# Patient Record
Sex: FEMALE | Race: WHITE | Employment: FULL TIME | URBAN - METROPOLITAN AREA
[De-identification: names, ages, dates, MRNs, and addresses within clinical notes are randomized per-mention and may not be internally consistent; named-entity substitution may affect disease eponyms.]

---

## 2019-06-03 ENCOUNTER — NURSE ONLY (OUTPATIENT)
Dept: INTERNAL MEDICINE CLINIC | Facility: HOSPITAL | Age: 29
End: 2019-06-03
Attending: EMERGENCY MEDICINE

## 2019-06-03 DIAGNOSIS — Z00.00 WELLNESS EXAMINATION: Primary | ICD-10-CM

## 2019-06-03 PROCEDURE — 86787 VARICELLA-ZOSTER ANTIBODY: CPT

## 2019-10-18 ENCOUNTER — OFFICE VISIT (OUTPATIENT)
Dept: OTHER | Facility: HOSPITAL | Age: 29
End: 2019-10-18
Attending: EMERGENCY MEDICINE

## 2019-10-18 DIAGNOSIS — Z77.21 PERSONAL HISTORY OF EXPOSURE TO POTENTIALLY HAZARDOUS BODY FLUIDS: Primary | ICD-10-CM

## 2019-10-18 PROCEDURE — 86706 HEP B SURFACE ANTIBODY: CPT

## 2019-10-18 PROCEDURE — 86803 HEPATITIS C AB TEST: CPT

## 2019-10-18 PROCEDURE — 87389 HIV-1 AG W/HIV-1&-2 AB AG IA: CPT

## 2019-11-22 ENCOUNTER — TELEPHONE (OUTPATIENT)
Dept: FAMILY MEDICINE CLINIC | Facility: CLINIC | Age: 29
End: 2019-11-22

## 2019-11-22 NOTE — TELEPHONE ENCOUNTER
Called and left message on machine confirming appointment with Yoko Aguayo 11/25/19, asked that she call to reschedule if unable to keep appointment

## 2019-11-25 NOTE — PROGRESS NOTES
Dimitry Dixon is a 26year old female.     S:  Patient presents today with the following concerns:  Establish Care (New patient Moved from [facility] 5/2019)   Headache (Chronic daily HA at temple area or over entire head lasting all day)   Concentration (t Medication Sig Dispense Refill   • Propranolol HCl ER 60 MG Oral Capsule SR 24 Hr Take 1 capsule (60 mg total) by mouth daily. 30 capsule 0     There is no problem list on file for this patient. No family history on file.     REVIEW OF SYSTEMS:  GENERA Consults:  None    Discussed with patient I would like to try propranolol 1st as it may help with the anxiety, headaches, and palpitations. No other one medication could cover all of these. Monitor for low blood pressure symptoms.   Try 1st when she is

## 2019-11-26 PROBLEM — R51.9 CHRONIC DAILY HEADACHE: Status: ACTIVE | Noted: 2019-11-26

## 2019-11-26 PROBLEM — R00.2 INTERMITTENT PALPITATIONS: Status: ACTIVE | Noted: 2019-11-26

## 2019-11-26 PROBLEM — F41.9 ANXIETY: Status: ACTIVE | Noted: 2019-11-26

## 2019-11-27 NOTE — PATIENT INSTRUCTIONS
Your Body’s Response to Anxiety    Normal anxiety is part of the body’s natural defense system.  It's an alert to a threat that is unknown, vague, or comes from your own internal fears. While you’re in this state, your feelings can range from a vague sens Some people are more prone to persistent anxiety than others. It tends to run in families, and it affects more younger people than older people, and more women than men. But no age, race, or gender is immune to anxiety problems.   Anxiety can be treated  Th © 5301-6166 [employer] [address]. [address] [facility], [address]. All rights reserved. This information is not intended as a substitute for professional medical care. Always follow your healthcare professional's instructions.         Self-Ca Any of the following can be signs:  · Dull pain or feeling of pressure in a tight band around your head  · Pain in your neck or shoulders  · Headache without a definite beginning or end  · Headache after an activity such as driving or working on a computer

## 2019-12-10 ENCOUNTER — TELEPHONE (OUTPATIENT)
Dept: FAMILY MEDICINE CLINIC | Facility: CLINIC | Age: 29
End: 2019-12-10

## 2019-12-10 DIAGNOSIS — F41.9 ANXIETY: ICD-10-CM

## 2019-12-10 DIAGNOSIS — R51.9 CHRONIC DAILY HEADACHE: ICD-10-CM

## 2019-12-10 DIAGNOSIS — R00.2 INTERMITTENT PALPITATIONS: ICD-10-CM

## 2019-12-10 RX ORDER — PROPRANOLOL HCL 60 MG
1 CAPSULE, EXTENDED RELEASE 24HR ORAL DAILY
Qty: 90 CAPSULE | Refills: 1 | Status: SHIPPED | OUTPATIENT
Start: 2019-12-10 | End: 2020-06-29

## 2019-12-10 NOTE — TELEPHONE ENCOUNTER
Pt was in [place] and forget her med. .. Lillian Pierre Propranolol HCl ER 60 MG Oral Capsule SR 24 Hr. Can she get refill?  [facility]

## 2019-12-12 ENCOUNTER — OFFICE VISIT (OUTPATIENT)
Dept: FAMILY MEDICINE CLINIC | Facility: CLINIC | Age: 29
End: 2019-12-12
Payer: COMMERCIAL

## 2019-12-12 VITALS
TEMPERATURE: 99 F | DIASTOLIC BLOOD PRESSURE: 68 MMHG | SYSTOLIC BLOOD PRESSURE: 100 MMHG | HEART RATE: 88 BPM | RESPIRATION RATE: 16 BRPM

## 2019-12-12 DIAGNOSIS — R68.89 FLU-LIKE SYMPTOMS: Primary | ICD-10-CM

## 2019-12-12 DIAGNOSIS — R51.9 CHRONIC DAILY HEADACHE: ICD-10-CM

## 2019-12-12 DIAGNOSIS — F41.9 ANXIETY: ICD-10-CM

## 2019-12-12 DIAGNOSIS — R00.2 INTERMITTENT PALPITATIONS: ICD-10-CM

## 2019-12-12 DIAGNOSIS — R05.9 COUGH: ICD-10-CM

## 2019-12-12 PROCEDURE — 99214 OFFICE O/P EST MOD 30 MIN: CPT | Performed by: FAMILY MEDICINE

## 2019-12-12 RX ORDER — ALBUTEROL SULFATE 90 UG/1
2 AEROSOL, METERED RESPIRATORY (INHALATION) EVERY 4 HOURS PRN
Qty: 1 INHALER | Refills: 1 | Status: SHIPPED | OUTPATIENT
Start: 2019-12-12 | End: 2020-04-17

## 2019-12-12 RX ORDER — OSELTAMIVIR PHOSPHATE 75 MG/1
75 CAPSULE ORAL 2 TIMES DAILY
Qty: 10 CAPSULE | Refills: 0 | Status: SHIPPED | OUTPATIENT
Start: 2019-12-12 | End: 2020-11-11

## 2019-12-12 NOTE — PROGRESS NOTES
Troy Junior is a 26year old female. S:  Patient presents today with the following concerns:  · Nausea, SOB, chest pain with deep breath. Coughing. Hurts to cough. No fevers. Achy feeling. Began this morning. Nasal congestion. Sinuses sore. clear  EYES:PERRLA, EOMI  NECK: supple,no adenopathy  LUNGS: CTA, no RRW  CARDIO: RRR without murmur  GI: good BS's,no masses, HSM or tenderness  EXTREMITIES: no edema  NEURO: Oriented times three,cranial nerves are intact,motor and sensory are grossly int

## 2020-03-20 ENCOUNTER — TELEPHONE (OUTPATIENT)
Dept: FAMILY MEDICINE CLINIC | Facility: CLINIC | Age: 30
End: 2020-03-20

## 2020-03-20 DIAGNOSIS — R05.9 COUGH: ICD-10-CM

## 2020-03-20 RX ORDER — ALBUTEROL SULFATE 90 UG/1
2 AEROSOL, METERED RESPIRATORY (INHALATION) EVERY 4 HOURS PRN
Qty: 1 INHALER | Refills: 1 | Status: CANCELLED | OUTPATIENT
Start: 2020-03-20

## 2020-03-20 NOTE — TELEPHONE ENCOUNTER
[facility] sent a fax over to change from albuterol to ventolin or proair. Patient does not have asthma and was given this medication for cough and flu like symptoms. Not sure if this is appropriate to refill.

## 2020-03-23 NOTE — TELEPHONE ENCOUNTER
Patient has albuterol inhaler prescribed in December 2019 with one refill. Instructed by MA to check with pharmacy for that refill and if not available, let us know.

## 2020-03-23 NOTE — TELEPHONE ENCOUNTER
Pt called back, States she is requesting Albuterol. States she does not have asthma but takes prn for Allergies.      Routed to ERICA Phipps

## 2020-04-16 DIAGNOSIS — R05.9 COUGH: ICD-10-CM

## 2020-04-16 NOTE — TELEPHONE ENCOUNTER
Requested Prescriptions     Pending Prescriptions Disp Refills   • PROAIR  (90 Base) MCG/ACT Inhalation Aero Soln [Pharmacy Med Name: PROAIR HFA 90 MCG INHALER] 8.5 Inhaler 0     Sig: INHALE 2 PUFFS INTO LUNGS EVERY 4 HOURS AS NEEDED FOR SHORTNESS O

## 2020-06-10 DIAGNOSIS — R00.2 INTERMITTENT PALPITATIONS: ICD-10-CM

## 2020-06-10 DIAGNOSIS — F41.9 ANXIETY: ICD-10-CM

## 2020-06-10 DIAGNOSIS — R51.9 CHRONIC DAILY HEADACHE: ICD-10-CM

## 2020-06-10 NOTE — TELEPHONE ENCOUNTER
Refill request for:    Requested Prescriptions     Pending Prescriptions Disp Refills   • PROPRANOLOL HCL ER 60 MG Oral Capsule SR 24 Hr [Pharmacy Med Name: PROPRANOLOL ER 60 MG CAPSULE] 90 capsule 1     Sig: TAKE 1 CAPSULE BY MOUTH EVERY DAY        Last P

## 2020-06-10 NOTE — TELEPHONE ENCOUNTER
Left message for pt to call back to schedule either an office visit or virtual to go over medication

## 2020-06-29 RX ORDER — PROPRANOLOL HCL 60 MG
CAPSULE, EXTENDED RELEASE 24HR ORAL
Qty: 30 CAPSULE | Refills: 0 | Status: SHIPPED
Start: 2020-06-29 | End: 2020-11-11

## 2020-06-29 NOTE — TELEPHONE ENCOUNTER
LOB was less than one year ago Dec 2019, no labs have been completed by this patient   Will give a 30 day, short term refill until the patient is contacted

## 2020-08-12 DIAGNOSIS — Z78.9 PARTICIPANT IN HEALTH AND WELLNESS PLAN: Primary | ICD-10-CM

## 2020-08-14 ENCOUNTER — NURSE ONLY (OUTPATIENT)
Dept: LAB | Age: 30
End: 2020-08-14
Attending: PREVENTIVE MEDICINE

## 2020-08-14 DIAGNOSIS — Z78.9 PARTICIPANT IN HEALTH AND WELLNESS PLAN: ICD-10-CM

## 2020-08-14 LAB — SARS-COV-2 IGG SERPLBLD QL IA.RAPID: NEGATIVE

## 2020-08-14 PROCEDURE — 86769 SARS-COV-2 COVID-19 ANTIBODY: CPT

## 2020-10-12 ENCOUNTER — OFFICE VISIT (OUTPATIENT)
Dept: FAMILY MEDICINE CLINIC | Facility: CLINIC | Age: 30
End: 2020-10-12
Payer: COMMERCIAL

## 2020-10-12 VITALS
HEIGHT: 64.5 IN | WEIGHT: 186.19 LBS | DIASTOLIC BLOOD PRESSURE: 78 MMHG | SYSTOLIC BLOOD PRESSURE: 108 MMHG | HEART RATE: 80 BPM | TEMPERATURE: 97 F | BODY MASS INDEX: 31.4 KG/M2 | RESPIRATION RATE: 16 BRPM

## 2020-10-12 DIAGNOSIS — Z13.0 SCREENING, ANEMIA, DEFICIENCY, IRON: ICD-10-CM

## 2020-10-12 DIAGNOSIS — G89.29 CHRONIC NONINTRACTABLE HEADACHE, UNSPECIFIED HEADACHE TYPE: Primary | ICD-10-CM

## 2020-10-12 DIAGNOSIS — M54.41 CHRONIC RIGHT-SIDED LOW BACK PAIN WITH RIGHT-SIDED SCIATICA: ICD-10-CM

## 2020-10-12 DIAGNOSIS — R53.83 FATIGUE, UNSPECIFIED TYPE: ICD-10-CM

## 2020-10-12 DIAGNOSIS — Z30.09 COUNSELING FOR BIRTH CONTROL REGARDING INTRAUTERINE DEVICE (IUD): ICD-10-CM

## 2020-10-12 DIAGNOSIS — Z00.00 LABORATORY EXAMINATION ORDERED AS PART OF A ROUTINE GENERAL MEDICAL EXAMINATION: ICD-10-CM

## 2020-10-12 DIAGNOSIS — R51.9 CHRONIC NONINTRACTABLE HEADACHE, UNSPECIFIED HEADACHE TYPE: Primary | ICD-10-CM

## 2020-10-12 DIAGNOSIS — G89.29 CHRONIC RIGHT-SIDED LOW BACK PAIN WITH RIGHT-SIDED SCIATICA: ICD-10-CM

## 2020-10-12 DIAGNOSIS — Z13.21 ENCOUNTER FOR VITAMIN DEFICIENCY SCREENING: ICD-10-CM

## 2020-10-12 PROCEDURE — 3008F BODY MASS INDEX DOCD: CPT | Performed by: FAMILY MEDICINE

## 2020-10-12 PROCEDURE — 99214 OFFICE O/P EST MOD 30 MIN: CPT | Performed by: FAMILY MEDICINE

## 2020-10-12 PROCEDURE — 3074F SYST BP LT 130 MM HG: CPT | Performed by: FAMILY MEDICINE

## 2020-10-12 PROCEDURE — 3078F DIAST BP <80 MM HG: CPT | Performed by: FAMILY MEDICINE

## 2020-10-12 RX ORDER — TOPIRAMATE 25 MG/1
25 CAPSULE, COATED PELLETS ORAL DAILY
Qty: 30 CAPSULE | Refills: 1 | Status: SHIPPED | OUTPATIENT
Start: 2020-10-12 | End: 2020-11-18

## 2020-10-12 NOTE — PROGRESS NOTES
Gualberto Olsen is a 31year old female. S:  Patient presents today with the following concerns:   Fatigue (Tired all the time.  Mom passed away 7/2020)   Headache (Daily as long as she can remember from the forehead across the top of head to the poste dysuria  MUSCULOSKELETAL: see above  NEURO: see above    EXAM:  /78   Pulse 80   Temp 97.2 °F (36.2 °C) (Temporal)   Resp 16   Ht 64.5\"   Wt 186 lb 3.2 oz (84.5 kg)   LMP 11/22/2019   BMI 31.47 kg/m²   GENERAL: well developed, well nourished,in no a low back pain. Consider MRI as well. Follow up in 1 month or sooner if needed. Patient verbalizes understanding of plan.

## 2020-11-04 DIAGNOSIS — R51.9 CHRONIC NONINTRACTABLE HEADACHE, UNSPECIFIED HEADACHE TYPE: ICD-10-CM

## 2020-11-04 DIAGNOSIS — G89.29 CHRONIC NONINTRACTABLE HEADACHE, UNSPECIFIED HEADACHE TYPE: ICD-10-CM

## 2020-11-06 RX ORDER — TOPIRAMATE 25 MG/1
CAPSULE, COATED PELLETS ORAL
Qty: 30 CAPSULE | Refills: 1 | OUTPATIENT
Start: 2020-11-06

## 2020-11-09 ENCOUNTER — LAB ENCOUNTER (OUTPATIENT)
Dept: LAB | Facility: HOSPITAL | Age: 30
End: 2020-11-09
Attending: FAMILY MEDICINE
Payer: COMMERCIAL

## 2020-11-09 DIAGNOSIS — R51.9 CHRONIC DAILY HEADACHE: ICD-10-CM

## 2020-11-09 DIAGNOSIS — R53.83 FATIGUE, UNSPECIFIED TYPE: ICD-10-CM

## 2020-11-09 DIAGNOSIS — Z13.0 SCREENING, ANEMIA, DEFICIENCY, IRON: ICD-10-CM

## 2020-11-09 DIAGNOSIS — Z13.21 ENCOUNTER FOR VITAMIN DEFICIENCY SCREENING: ICD-10-CM

## 2020-11-09 DIAGNOSIS — Z00.00 LABORATORY EXAM ORDERED AS PART OF ROUTINE GENERAL MEDICAL EXAMINATION: ICD-10-CM

## 2020-11-09 DIAGNOSIS — Z00.00 LABORATORY EXAMINATION ORDERED AS PART OF A ROUTINE GENERAL MEDICAL EXAMINATION: ICD-10-CM

## 2020-11-09 PROCEDURE — 85652 RBC SED RATE AUTOMATED: CPT

## 2020-11-09 PROCEDURE — 85025 COMPLETE CBC W/AUTO DIFF WBC: CPT

## 2020-11-09 PROCEDURE — 80061 LIPID PANEL: CPT

## 2020-11-09 PROCEDURE — 82607 VITAMIN B-12: CPT

## 2020-11-09 PROCEDURE — 80053 COMPREHEN METABOLIC PANEL: CPT

## 2020-11-09 PROCEDURE — 82728 ASSAY OF FERRITIN: CPT

## 2020-11-09 PROCEDURE — 82306 VITAMIN D 25 HYDROXY: CPT

## 2020-11-09 PROCEDURE — 84443 ASSAY THYROID STIM HORMONE: CPT

## 2020-11-09 PROCEDURE — 36415 COLL VENOUS BLD VENIPUNCTURE: CPT

## 2020-11-11 DIAGNOSIS — E55.9 VITAMIN D DEFICIENCY: ICD-10-CM

## 2020-11-11 DIAGNOSIS — R53.83 FATIGUE, UNSPECIFIED TYPE: Primary | ICD-10-CM

## 2020-11-18 ENCOUNTER — TELEMEDICINE (OUTPATIENT)
Dept: FAMILY MEDICINE CLINIC | Facility: CLINIC | Age: 30
End: 2020-11-18

## 2020-11-18 DIAGNOSIS — R51.9 CHRONIC NONINTRACTABLE HEADACHE, UNSPECIFIED HEADACHE TYPE: ICD-10-CM

## 2020-11-18 DIAGNOSIS — G89.29 CHRONIC NONINTRACTABLE HEADACHE, UNSPECIFIED HEADACHE TYPE: ICD-10-CM

## 2020-11-18 PROCEDURE — 99213 OFFICE O/P EST LOW 20 MIN: CPT | Performed by: FAMILY MEDICINE

## 2020-11-18 RX ORDER — TOPIRAMATE 25 MG/1
50 CAPSULE, COATED PELLETS ORAL DAILY
Qty: 60 CAPSULE | Refills: 1 | Status: SHIPPED | OUTPATIENT
Start: 2020-11-18 | End: 2020-12-12

## 2020-11-18 NOTE — PROGRESS NOTES
Virtual Check-In    Shaquille Wellington verbally consents to a [employer] on 11/18/20. Patient understands and accepts financial responsibility for any deductible, co-insurance and/or co-pays associated with this service.     Duration of the se

## 2020-12-08 ENCOUNTER — OFFICE VISIT (OUTPATIENT)
Dept: OTHER | Facility: HOSPITAL | Age: 30
End: 2020-12-08
Attending: ORTHOPAEDIC SURGERY

## 2020-12-08 DIAGNOSIS — Z77.21 PERSONAL HISTORY OF EXPOSURE TO POTENTIALLY HAZARDOUS BODY FLUIDS: Primary | ICD-10-CM

## 2020-12-08 PROCEDURE — 87389 HIV-1 AG W/HIV-1&-2 AB AG IA: CPT

## 2020-12-08 PROCEDURE — 86803 HEPATITIS C AB TEST: CPT

## 2020-12-08 PROCEDURE — 86706 HEP B SURFACE ANTIBODY: CPT

## 2020-12-11 DIAGNOSIS — G89.29 CHRONIC NONINTRACTABLE HEADACHE, UNSPECIFIED HEADACHE TYPE: ICD-10-CM

## 2020-12-11 DIAGNOSIS — R51.9 CHRONIC NONINTRACTABLE HEADACHE, UNSPECIFIED HEADACHE TYPE: ICD-10-CM

## 2020-12-12 RX ORDER — TOPIRAMATE 25 MG/1
50 CAPSULE, COATED PELLETS ORAL DAILY
Qty: 180 CAPSULE | Refills: 0 | Status: SHIPPED | OUTPATIENT
Start: 2020-12-12 | End: 2021-03-18

## 2020-12-17 ENCOUNTER — IMMUNIZATION (OUTPATIENT)
Dept: LAB | Facility: HOSPITAL | Age: 30
End: 2020-12-17
Attending: PREVENTIVE MEDICINE

## 2020-12-17 DIAGNOSIS — Z23 NEED FOR VACCINATION: ICD-10-CM

## 2020-12-17 PROCEDURE — 0001A PFIZER-BIONTECH COVID-19 VACCINE: CPT

## 2021-01-04 ENCOUNTER — HOSPITAL ENCOUNTER (OUTPATIENT)
Dept: GENERAL RADIOLOGY | Facility: HOSPITAL | Age: 31
Discharge: HOME | End: 2021-01-04
Attending: EMERGENCY MEDICINE

## 2021-01-04 ENCOUNTER — OFFICE VISIT (OUTPATIENT)
Dept: OTHER | Facility: HOSPITAL | Age: 31
End: 2021-01-04
Attending: EMERGENCY MEDICINE

## 2021-01-04 DIAGNOSIS — R52 PAIN: Primary | ICD-10-CM

## 2021-01-04 DIAGNOSIS — R52 PAIN: ICD-10-CM

## 2021-01-04 PROCEDURE — 72170 X-RAY EXAM OF PELVIS: CPT | Performed by: EMERGENCY MEDICINE

## 2021-01-04 PROCEDURE — 72110 X-RAY EXAM L-2 SPINE 4/>VWS: CPT | Performed by: EMERGENCY MEDICINE

## 2021-01-07 ENCOUNTER — IMMUNIZATION (OUTPATIENT)
Dept: LAB | Facility: HOSPITAL | Age: 31
End: 2021-01-07
Attending: PREVENTIVE MEDICINE

## 2021-01-07 DIAGNOSIS — Z23 NEED FOR VACCINATION: ICD-10-CM

## 2021-01-07 PROCEDURE — 0002A SARSCOV2 VAC 30MCG/0.3ML IM: CPT

## 2021-01-11 ENCOUNTER — APPOINTMENT (OUTPATIENT)
Dept: OTHER | Facility: HOSPITAL | Age: 31
End: 2021-01-11
Attending: EMERGENCY MEDICINE

## 2021-02-19 ENCOUNTER — OFFICE VISIT (OUTPATIENT)
Dept: OTHER | Facility: HOSPITAL | Age: 31
End: 2021-02-19
Attending: EMERGENCY MEDICINE

## 2021-02-19 DIAGNOSIS — R52 PAIN: Primary | ICD-10-CM

## 2021-02-25 ENCOUNTER — TELEPHONE (OUTPATIENT)
Dept: PHYSICAL THERAPY | Facility: HOSPITAL | Age: 31
End: 2021-02-25

## 2021-03-06 DIAGNOSIS — R51.9 CHRONIC NONINTRACTABLE HEADACHE, UNSPECIFIED HEADACHE TYPE: ICD-10-CM

## 2021-03-06 DIAGNOSIS — G89.29 CHRONIC NONINTRACTABLE HEADACHE, UNSPECIFIED HEADACHE TYPE: ICD-10-CM

## 2021-03-08 NOTE — TELEPHONE ENCOUNTER
Pt has always see Olivier PAC and will need to establish with Dr Helen Adame at an OV to address chronic headaches  Please assist with scheduling

## 2021-03-08 NOTE — TELEPHONE ENCOUNTER
TOPIRAMATE 25 MG SPRINKLE CAP     Sig: Take 2 capsules (50 mg total) by mouth daily.     Disp:  180 capsule    Refills:  0 (Pharmacy requested: Not specified)    Start: 3/6/2021    Class: Normal    Non-formulary For: Chronic nonintractable headache, unspeci

## 2021-03-08 NOTE — TELEPHONE ENCOUNTER
LOV w/Olivier PAC on virtual 11/18/2020 for headache  NOTED:   A:  Chronic headaches     P:  Increase Topamax to 50 mg in the evening. She will take 2 capsules of 25 mg each.     No future OV scheduled

## 2021-03-11 ENCOUNTER — OFFICE VISIT (OUTPATIENT)
Dept: FAMILY MEDICINE CLINIC | Facility: CLINIC | Age: 31
End: 2021-03-11
Payer: COMMERCIAL

## 2021-03-11 VITALS
HEART RATE: 76 BPM | WEIGHT: 189 LBS | BODY MASS INDEX: 31.87 KG/M2 | RESPIRATION RATE: 16 BRPM | HEIGHT: 64.5 IN | SYSTOLIC BLOOD PRESSURE: 102 MMHG | DIASTOLIC BLOOD PRESSURE: 74 MMHG | TEMPERATURE: 98 F

## 2021-03-11 DIAGNOSIS — R21 RASH: ICD-10-CM

## 2021-03-11 DIAGNOSIS — R51.9 CHRONIC DAILY HEADACHE: ICD-10-CM

## 2021-03-11 DIAGNOSIS — M54.41 ACUTE RIGHT-SIDED LOW BACK PAIN WITH RIGHT-SIDED SCIATICA: Primary | ICD-10-CM

## 2021-03-11 PROCEDURE — 3008F BODY MASS INDEX DOCD: CPT | Performed by: FAMILY MEDICINE

## 2021-03-11 PROCEDURE — 3074F SYST BP LT 130 MM HG: CPT | Performed by: FAMILY MEDICINE

## 2021-03-11 PROCEDURE — 99214 OFFICE O/P EST MOD 30 MIN: CPT | Performed by: FAMILY MEDICINE

## 2021-03-11 PROCEDURE — 3078F DIAST BP <80 MM HG: CPT | Performed by: FAMILY MEDICINE

## 2021-03-11 RX ORDER — CYCLOBENZAPRINE HCL 10 MG
10 TABLET ORAL 3 TIMES DAILY PRN
Qty: 30 TABLET | Refills: 1 | Status: SHIPPED | OUTPATIENT
Start: 2021-03-11 | End: 2021-11-28

## 2021-03-11 RX ORDER — VIT C/HESPERIDIN/BIOFLAVONOIDS 500-100 MG
2 TABLET ORAL DAILY
COMMUNITY

## 2021-03-11 RX ORDER — PNV NO.95/FERROUS FUM/FOLIC AC 28MG-0.8MG
1 TABLET ORAL DAILY
COMMUNITY

## 2021-03-11 NOTE — PROGRESS NOTES
Chief Complaint:  Patient presents with:  Migraine: To discuss Topamax  Worker's Comp: January 2021 fell in parking lot at work landing on R low back. Pain returned 2 weeks ago. Taking flexeril,heatingpad and stretching. Wondering if PT will help.   Hair/Sc Completed    ROS: Review of systems performed and negative unless stated in HPI. Past medical, family and social history reviewed and listed as below. HISTORY:  History reviewed. No pertinent past medical history. History reviewed.  No pertinent carmelita murmurs, no peripheral edema   EXTREMITIES: warm and well perfused  SKIN: erythematous flaky patch to L frontal scalp and posterior scalp  PSYCH: pleasant and cooperative, no obvious depression or anxiety    ASSESSMENT AND PLAN:  Discussed the following as

## 2021-03-18 RX ORDER — TOPIRAMATE 25 MG/1
50 CAPSULE, COATED PELLETS ORAL DAILY
Qty: 180 CAPSULE | Refills: 1 | Status: SHIPPED | OUTPATIENT
Start: 2021-03-18

## 2021-08-11 ENCOUNTER — PATIENT MESSAGE (OUTPATIENT)
Dept: FAMILY MEDICINE CLINIC | Facility: CLINIC | Age: 31
End: 2021-08-11

## 2021-08-11 ENCOUNTER — TELEPHONE (OUTPATIENT)
Dept: INTERNAL MEDICINE CLINIC | Facility: HOSPITAL | Age: 31
End: 2021-08-11

## 2021-08-11 LAB — AMB EXT COVID-19 RESULT: DETECTED

## 2021-08-11 NOTE — TELEPHONE ENCOUNTER
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 days  When was the last shift you worked?  8/10/21  When are you next scheduled to work? n/a     Did you have close contact with someone on your unit while not wearing a mask? (e.g., during meal breaks):  Yes [x]   No [

## 2021-08-12 NOTE — TELEPHONE ENCOUNTER
From: Pedro Viveros  To:  Baron Judy DO  Sent: 8/11/2021 1:34 PM CDT  Subject: Other    Jacob John, I just wanted to inform you that I tested positive for covid yesterday, and if there are any steps I should be taking besides monitoring my temp

## 2021-09-04 ENCOUNTER — PATIENT MESSAGE (OUTPATIENT)
Dept: FAMILY MEDICINE CLINIC | Facility: CLINIC | Age: 31
End: 2021-09-04

## 2021-09-07 NOTE — TELEPHONE ENCOUNTER
From: Sruthi Campbell  To:  Lulú Finch DO  Sent: 9/4/2021 1:12 PM CDT  Subject: Non-Urgent Berta Hloley, my  Alo Gonzalez and I will be traveling to [facility] this upcoming Thursday, and since we previously had covid and are recov

## 2021-10-01 ENCOUNTER — IMMUNIZATION (OUTPATIENT)
Dept: LAB | Facility: HOSPITAL | Age: 31
End: 2021-10-01
Attending: EMERGENCY MEDICINE

## 2021-10-01 DIAGNOSIS — Z23 NEED FOR VACCINATION: Primary | ICD-10-CM

## 2021-10-01 PROCEDURE — 0004A SARSCOV2 VAC 30MCG/0.3ML IM: CPT

## 2021-10-01 PROCEDURE — 0003A SARSCOV2 VAC 30MCG/0.3ML IM: CPT

## 2021-11-25 DIAGNOSIS — M54.41 ACUTE RIGHT-SIDED LOW BACK PAIN WITH RIGHT-SIDED SCIATICA: ICD-10-CM

## 2021-11-25 RX ORDER — CYCLOBENZAPRINE HCL 10 MG
10 TABLET ORAL 3 TIMES DAILY PRN
Qty: 30 TABLET | Refills: 1 | Status: CANCELLED | OUTPATIENT
Start: 2021-11-25

## 2021-12-30 ENCOUNTER — OFFICE VISIT (OUTPATIENT)
Dept: FAMILY MEDICINE CLINIC | Facility: CLINIC | Age: 31
End: 2021-12-30
Payer: COMMERCIAL

## 2021-12-30 VITALS
BODY MASS INDEX: 31.54 KG/M2 | SYSTOLIC BLOOD PRESSURE: 120 MMHG | HEART RATE: 103 BPM | RESPIRATION RATE: 16 BRPM | OXYGEN SATURATION: 100 % | HEIGHT: 64.5 IN | TEMPERATURE: 97 F | WEIGHT: 187 LBS | DIASTOLIC BLOOD PRESSURE: 74 MMHG

## 2021-12-30 DIAGNOSIS — K21.9 GASTROESOPHAGEAL REFLUX DISEASE, UNSPECIFIED WHETHER ESOPHAGITIS PRESENT: ICD-10-CM

## 2021-12-30 DIAGNOSIS — R00.2 PALPITATIONS: Primary | ICD-10-CM

## 2021-12-30 DIAGNOSIS — E55.9 VITAMIN D DEFICIENCY: ICD-10-CM

## 2021-12-30 PROCEDURE — 93000 ELECTROCARDIOGRAM COMPLETE: CPT | Performed by: FAMILY MEDICINE

## 2021-12-30 PROCEDURE — 3074F SYST BP LT 130 MM HG: CPT | Performed by: FAMILY MEDICINE

## 2021-12-30 PROCEDURE — 99214 OFFICE O/P EST MOD 30 MIN: CPT | Performed by: FAMILY MEDICINE

## 2021-12-30 PROCEDURE — 3008F BODY MASS INDEX DOCD: CPT | Performed by: FAMILY MEDICINE

## 2021-12-30 PROCEDURE — 3078F DIAST BP <80 MM HG: CPT | Performed by: FAMILY MEDICINE

## 2021-12-30 NOTE — PROGRESS NOTES
sChief Complaint:  Patient presents with:  Palpitations: Pt had Covid 08/2021, states palpitations are worse, feeling SOB, feels hot often, occasional chest pain   Headache: Follow Up     HPI:  This is a 27year old female patient presenting for Palpitatio mouth 3 (three) times daily as needed for Muscle spasms. 30 tablet 0   • Topiramate 25 MG Oral Capsule Sprinkle Take 2 capsules (50 mg total) by mouth daily. 180 capsule 1   • Zinc 30 MG Oral Tab Take 2 tablets by mouth daily.      • BLACK ELDERBERRY OR [place] orders for this visit:    Palpitations   EKG shows normal sinus rhythm today. Will obtain labs to rule out underlying cause.   We will plan for Holter monitor to further assess.  -     ELECTROCARDIOGRAM, COMPLETE  -     CBC WITH DIFFERENTIAL WITH PLATELET;

## 2022-01-13 ENCOUNTER — LAB ENCOUNTER (OUTPATIENT)
Dept: LAB | Facility: REFERENCE LAB | Age: 32
End: 2022-01-13
Attending: FAMILY MEDICINE
Payer: COMMERCIAL

## 2022-01-13 DIAGNOSIS — D64.9 NORMOCYTIC ANEMIA: ICD-10-CM

## 2022-01-13 DIAGNOSIS — E55.9 VITAMIN D DEFICIENCY: ICD-10-CM

## 2022-01-13 DIAGNOSIS — R00.2 PALPITATIONS: ICD-10-CM

## 2022-01-13 LAB
ALBUMIN SERPL-MCNC: 3.6 G/DL (ref 3.4–5)
ALBUMIN/GLOB SERPL: 1.1 {RATIO} (ref 1–2)
ALP LIVER SERPL-CCNC: 62 U/L
ALT SERPL-CCNC: 15 U/L
ANION GAP SERPL CALC-SCNC: 5 MMOL/L (ref 0–18)
AST SERPL-CCNC: 9 U/L (ref 15–37)
BASOPHILS # BLD AUTO: 0.04 X10(3) UL (ref 0–0.2)
BASOPHILS NFR BLD AUTO: 0.6 %
BILIRUB SERPL-MCNC: 0.2 MG/DL (ref 0.1–2)
BUN BLD-MCNC: 12 MG/DL (ref 7–18)
BUN/CREAT SERPL: 15.8 (ref 10–20)
CALCIUM BLD-MCNC: 8.9 MG/DL (ref 8.5–10.1)
CHLORIDE SERPL-SCNC: 109 MMOL/L (ref 98–112)
CO2 SERPL-SCNC: 28 MMOL/L (ref 21–32)
CREAT BLD-MCNC: 0.76 MG/DL
DEPRECATED HBV CORE AB SER IA-ACNC: 59.3 NG/ML
DEPRECATED RDW RBC AUTO: 37.3 FL (ref 35.1–46.3)
EOSINOPHIL # BLD AUTO: 0.1 X10(3) UL (ref 0–0.7)
EOSINOPHIL NFR BLD AUTO: 1.4 %
ERYTHROCYTE [DISTWIDTH] IN BLOOD BY AUTOMATED COUNT: 12.3 % (ref 11–15)
FASTING STATUS PATIENT QL REPORTED: NO
GLOBULIN PLAS-MCNC: 3.2 G/DL (ref 2.8–4.4)
GLUCOSE BLD-MCNC: 85 MG/DL (ref 70–99)
HCT VFR BLD AUTO: 34.9 %
HGB BLD-MCNC: 11.6 G/DL
IMM GRANULOCYTES # BLD AUTO: 0.02 X10(3) UL (ref 0–1)
IMM GRANULOCYTES NFR BLD: 0.3 %
LYMPHOCYTES # BLD AUTO: 2.23 X10(3) UL (ref 1–4)
LYMPHOCYTES NFR BLD AUTO: 30.9 %
MCH RBC QN AUTO: 28 PG (ref 26–34)
MCHC RBC AUTO-ENTMCNC: 33.2 G/DL (ref 31–37)
MCV RBC AUTO: 84.3 FL
MONOCYTES # BLD AUTO: 0.79 X10(3) UL (ref 0.1–1)
MONOCYTES NFR BLD AUTO: 11 %
NEUTROPHILS # BLD AUTO: 4.03 X10 (3) UL (ref 1.5–7.7)
NEUTROPHILS # BLD AUTO: 4.03 X10(3) UL (ref 1.5–7.7)
NEUTROPHILS NFR BLD AUTO: 55.8 %
OSMOLALITY SERPL CALC.SUM OF ELEC: 293 MOSM/KG (ref 275–295)
PLATELET # BLD AUTO: 257 10(3)UL (ref 150–450)
POTASSIUM SERPL-SCNC: 3.9 MMOL/L (ref 3.5–5.1)
PROT SERPL-MCNC: 6.8 G/DL (ref 6.4–8.2)
RBC # BLD AUTO: 4.14 X10(6)UL
SODIUM SERPL-SCNC: 142 MMOL/L (ref 136–145)
TSI SER-ACNC: 1.28 MIU/ML (ref 0.36–3.74)
VIT D+METAB SERPL-MCNC: 20.4 NG/ML (ref 30–100)
WBC # BLD AUTO: 7.2 X10(3) UL (ref 4–11)

## 2022-01-13 PROCEDURE — 80053 COMPREHEN METABOLIC PANEL: CPT

## 2022-01-13 PROCEDURE — 82728 ASSAY OF FERRITIN: CPT

## 2022-01-13 PROCEDURE — 36415 COLL VENOUS BLD VENIPUNCTURE: CPT

## 2022-01-13 PROCEDURE — 84443 ASSAY THYROID STIM HORMONE: CPT

## 2022-01-13 PROCEDURE — 82306 VITAMIN D 25 HYDROXY: CPT

## 2022-01-13 PROCEDURE — 83540 ASSAY OF IRON: CPT

## 2022-01-13 PROCEDURE — 84466 ASSAY OF TRANSFERRIN: CPT

## 2022-01-13 PROCEDURE — 85025 COMPLETE CBC W/AUTO DIFF WBC: CPT

## 2022-01-13 PROCEDURE — 82607 VITAMIN B-12: CPT

## 2022-01-14 DIAGNOSIS — E55.9 VITAMIN D DEFICIENCY: ICD-10-CM

## 2022-01-14 DIAGNOSIS — D64.9 NORMOCYTIC ANEMIA: Primary | ICD-10-CM

## 2022-01-14 LAB
IRON SATN MFR SERPL: 15 %
IRON SERPL-MCNC: 48 UG/DL
TIBC SERPL-MCNC: 322 UG/DL (ref 240–450)
TRANSFERRIN SERPL-MCNC: 216 MG/DL (ref 200–360)
VIT B12 SERPL-MCNC: 487 PG/ML (ref 193–986)

## 2022-01-14 RX ORDER — ERGOCALCIFEROL 1.25 MG/1
50000 CAPSULE ORAL WEEKLY
Qty: 12 CAPSULE | Refills: 0 | Status: SHIPPED | OUTPATIENT
Start: 2022-01-14

## 2022-04-24 ENCOUNTER — PATIENT MESSAGE (OUTPATIENT)
Dept: FAMILY MEDICINE CLINIC | Facility: CLINIC | Age: 32
End: 2022-04-24

## 2022-04-25 RX ORDER — TOPIRAMATE 25 MG/1
50 CAPSULE, COATED PELLETS ORAL DAILY
Qty: 180 CAPSULE | Refills: 0 | Status: SHIPPED | OUTPATIENT
Start: 2022-04-25

## 2022-04-25 NOTE — TELEPHONE ENCOUNTER
From: Liz Frazier  To: Yan Taylor DO  Sent: 4/24/2022 2:42 PM CDT  Subject: Medication refill topamax    Good Evening, I recently started taking, topamax agin to help with my headaches, and it has been helping, I finished my last bottle yesterday and was wondering if I can start getting them refilled again? Thank you and I hope you had a great weekend.

## 2022-06-01 ENCOUNTER — TELEPHONE (OUTPATIENT)
Dept: INTERNAL MEDICINE CLINIC | Facility: HOSPITAL | Age: 32
End: 2022-06-01

## 2022-07-27 DIAGNOSIS — G89.29 CHRONIC NONINTRACTABLE HEADACHE, UNSPECIFIED HEADACHE TYPE: ICD-10-CM

## 2022-07-27 DIAGNOSIS — R51.9 CHRONIC NONINTRACTABLE HEADACHE, UNSPECIFIED HEADACHE TYPE: ICD-10-CM

## 2022-07-28 RX ORDER — TOPIRAMATE 25 MG/1
CAPSULE, COATED PELLETS ORAL
Qty: 180 CAPSULE | Refills: 0 | Status: SHIPPED | OUTPATIENT
Start: 2022-07-28

## 2022-10-27 DIAGNOSIS — R51.9 CHRONIC NONINTRACTABLE HEADACHE, UNSPECIFIED HEADACHE TYPE: ICD-10-CM

## 2022-10-27 DIAGNOSIS — G89.29 CHRONIC NONINTRACTABLE HEADACHE, UNSPECIFIED HEADACHE TYPE: ICD-10-CM

## 2022-11-02 DIAGNOSIS — R51.9 CHRONIC NONINTRACTABLE HEADACHE, UNSPECIFIED HEADACHE TYPE: ICD-10-CM

## 2022-11-02 DIAGNOSIS — E55.9 VITAMIN D DEFICIENCY: ICD-10-CM

## 2022-11-02 DIAGNOSIS — G89.29 CHRONIC NONINTRACTABLE HEADACHE, UNSPECIFIED HEADACHE TYPE: ICD-10-CM

## 2022-11-02 RX ORDER — ERGOCALCIFEROL 1.25 MG/1
50000 CAPSULE ORAL WEEKLY
Qty: 12 CAPSULE | Refills: 0 | Status: CANCELLED | OUTPATIENT
Start: 2022-11-02

## 2022-11-08 ENCOUNTER — TELEPHONE (OUTPATIENT)
Dept: FAMILY MEDICINE CLINIC | Facility: CLINIC | Age: 32
End: 2022-11-08

## 2022-11-08 DIAGNOSIS — G89.29 CHRONIC NONINTRACTABLE HEADACHE, UNSPECIFIED HEADACHE TYPE: ICD-10-CM

## 2022-11-08 DIAGNOSIS — R51.9 CHRONIC NONINTRACTABLE HEADACHE, UNSPECIFIED HEADACHE TYPE: ICD-10-CM

## 2022-11-08 DIAGNOSIS — E55.9 VITAMIN D DEFICIENCY: ICD-10-CM

## 2022-11-08 RX ORDER — TOPIRAMATE 25 MG/1
50 CAPSULE, COATED PELLETS ORAL DAILY
Qty: 180 CAPSULE | Refills: 0 | OUTPATIENT
Start: 2022-11-08

## 2022-11-08 RX ORDER — ERGOCALCIFEROL 1.25 MG/1
50000 CAPSULE ORAL WEEKLY
Qty: 12 CAPSULE | Refills: 0 | OUTPATIENT
Start: 2022-11-08

## 2022-11-14 ENCOUNTER — TELEPHONE (OUTPATIENT)
Dept: FAMILY MEDICINE CLINIC | Facility: CLINIC | Age: 32
End: 2022-11-14

## 2022-11-14 DIAGNOSIS — Z00.00 LABORATORY EXAMINATION ORDERED AS PART OF A COMPLETE PHYSICAL EXAMINATION: Primary | ICD-10-CM

## 2022-11-14 DIAGNOSIS — E55.9 VITAMIN D DEFICIENCY: ICD-10-CM

## 2022-11-14 NOTE — TELEPHONE ENCOUNTER
Pt scheduled for 12/1/22 with Roslyn Sotomayor PA-C.  Out of her medication please send enough to get to appt

## 2022-11-14 NOTE — TELEPHONE ENCOUNTER
Please enter lab orders for the patient's upcoming physical appointment. Physical scheduled: Your appointments     Date & Time Appointment Department [facility])    Dec 01, 2022  1:15 PM CST Physical - Established with JAMES Gamble [address] [place] [address], [address], [place]  ([address] [place] [address])            Alma Rosa Basurto [id number]         [facility])     The patient has been notified to complete fasting labs prior to their physical appointment.

## 2022-11-15 RX ORDER — TOPIRAMATE 25 MG/1
CAPSULE, COATED PELLETS ORAL
Qty: 180 CAPSULE | Refills: 0 | OUTPATIENT
Start: 2022-11-15

## 2022-11-22 RX ORDER — TOPIRAMATE 25 MG/1
50 CAPSULE, COATED PELLETS ORAL DAILY
Qty: 180 CAPSULE | Refills: 0 | OUTPATIENT
Start: 2022-11-22

## 2022-12-06 ENCOUNTER — OFFICE VISIT (OUTPATIENT)
Dept: FAMILY MEDICINE CLINIC | Facility: CLINIC | Age: 32
End: 2022-12-06
Payer: COMMERCIAL

## 2022-12-06 VITALS
HEART RATE: 90 BPM | HEIGHT: 64.5 IN | BODY MASS INDEX: 32.89 KG/M2 | WEIGHT: 195 LBS | TEMPERATURE: 97 F | SYSTOLIC BLOOD PRESSURE: 110 MMHG | DIASTOLIC BLOOD PRESSURE: 70 MMHG | RESPIRATION RATE: 16 BRPM | OXYGEN SATURATION: 99 %

## 2022-12-06 DIAGNOSIS — N94.6 DYSMENORRHEA: ICD-10-CM

## 2022-12-06 DIAGNOSIS — Z12.4 SCREENING FOR CERVICAL CANCER: ICD-10-CM

## 2022-12-06 DIAGNOSIS — N64.4 BREAST TENDERNESS: ICD-10-CM

## 2022-12-06 DIAGNOSIS — R41.840 ATTENTION DEFICIT: ICD-10-CM

## 2022-12-06 DIAGNOSIS — Z00.00 WELL ADULT EXAM: Primary | ICD-10-CM

## 2022-12-06 DIAGNOSIS — F41.9 ANXIETY: ICD-10-CM

## 2022-12-06 DIAGNOSIS — R51.9 CHRONIC DAILY HEADACHE: ICD-10-CM

## 2022-12-06 PROCEDURE — 99213 OFFICE O/P EST LOW 20 MIN: CPT | Performed by: PHYSICIAN ASSISTANT

## 2022-12-06 PROCEDURE — 3078F DIAST BP <80 MM HG: CPT | Performed by: PHYSICIAN ASSISTANT

## 2022-12-06 PROCEDURE — 3008F BODY MASS INDEX DOCD: CPT | Performed by: PHYSICIAN ASSISTANT

## 2022-12-06 PROCEDURE — 87624 HPV HI-RISK TYP POOLED RSLT: CPT | Performed by: PHYSICIAN ASSISTANT

## 2022-12-06 PROCEDURE — 99395 PREV VISIT EST AGE 18-39: CPT | Performed by: PHYSICIAN ASSISTANT

## 2022-12-06 PROCEDURE — 3074F SYST BP LT 130 MM HG: CPT | Performed by: PHYSICIAN ASSISTANT

## 2022-12-06 RX ORDER — NORTRIPTYLINE HYDROCHLORIDE 25 MG/1
25 CAPSULE ORAL NIGHTLY
Qty: 90 CAPSULE | Refills: 0 | Status: SHIPPED | OUTPATIENT
Start: 2022-12-06

## 2022-12-07 LAB — HPV I/H RISK 1 DNA SPEC QL NAA+PROBE: NEGATIVE

## 2023-03-02 RX ORDER — NORTRIPTYLINE HYDROCHLORIDE 25 MG/1
CAPSULE ORAL
Qty: 90 CAPSULE | Refills: 0 | Status: SHIPPED | OUTPATIENT
Start: 2023-03-02

## 2023-03-03 ENCOUNTER — OFFICE VISIT (OUTPATIENT)
Dept: OBGYN CLINIC | Facility: CLINIC | Age: 33
End: 2023-03-03

## 2023-03-03 VITALS
WEIGHT: 197.38 LBS | SYSTOLIC BLOOD PRESSURE: 122 MMHG | DIASTOLIC BLOOD PRESSURE: 78 MMHG | BODY MASS INDEX: 33 KG/M2 | RESPIRATION RATE: 18 BRPM

## 2023-03-03 DIAGNOSIS — N92.0 MENORRHAGIA WITH REGULAR CYCLE: ICD-10-CM

## 2023-03-03 DIAGNOSIS — Z20.2 HPV EXPOSURE: Primary | ICD-10-CM

## 2023-03-03 RX ORDER — LEVONORGESTREL/ETHINYL ESTRADIOL 2.6; 2.3 MG/1; MG/1
1 PATCH TRANSDERMAL WEEKLY
Qty: 3 PATCH | Refills: 4 | Status: SHIPPED | OUTPATIENT
Start: 2023-03-03 | End: 2023-03-31

## 2023-03-07 ENCOUNTER — TELEPHONE (OUTPATIENT)
Dept: OBGYN CLINIC | Facility: CLINIC | Age: 33
End: 2023-03-07

## 2023-03-07 NOTE — TELEPHONE ENCOUNTER
Levonorgestrel-Eth Estradiol (TWIRLA) 120-30 MCG/24HR Transdermal Patch Weekly, Place 1 patch onto the skin once a week for 28 days. , Disp: 3 patch, Rfl: 4    REASON FOR REQUEST:   Missing/illegible information on Rx    PHARMACY COMMENTS:   Missing/ illegible information on Rx - further clarification: drug not on formulary

## 2023-04-14 ENCOUNTER — OFFICE VISIT (OUTPATIENT)
Dept: OBGYN CLINIC | Facility: CLINIC | Age: 33
End: 2023-04-14

## 2023-04-14 VITALS
BODY MASS INDEX: 33.2 KG/M2 | DIASTOLIC BLOOD PRESSURE: 84 MMHG | HEIGHT: 64.5 IN | WEIGHT: 196.88 LBS | SYSTOLIC BLOOD PRESSURE: 124 MMHG

## 2023-04-14 DIAGNOSIS — L98.9 SKIN LESION: Primary | ICD-10-CM

## 2023-04-14 PROCEDURE — 99212 OFFICE O/P EST SF 10 MIN: CPT | Performed by: OBSTETRICS & GYNECOLOGY

## 2023-04-14 PROCEDURE — 3079F DIAST BP 80-89 MM HG: CPT | Performed by: OBSTETRICS & GYNECOLOGY

## 2023-04-14 PROCEDURE — 3074F SYST BP LT 130 MM HG: CPT | Performed by: OBSTETRICS & GYNECOLOGY

## 2023-04-14 PROCEDURE — 3008F BODY MASS INDEX DOCD: CPT | Performed by: OBSTETRICS & GYNECOLOGY

## 2023-05-20 DIAGNOSIS — G89.29 CHRONIC NONINTRACTABLE HEADACHE, UNSPECIFIED HEADACHE TYPE: ICD-10-CM

## 2023-05-20 DIAGNOSIS — R51.9 CHRONIC NONINTRACTABLE HEADACHE, UNSPECIFIED HEADACHE TYPE: ICD-10-CM

## 2023-05-23 ENCOUNTER — NURSE ONLY (OUTPATIENT)
Dept: OBGYN CLINIC | Facility: CLINIC | Age: 33
End: 2023-05-23

## 2023-05-23 DIAGNOSIS — Z20.2 HPV EXPOSURE: Primary | ICD-10-CM

## 2023-05-23 PROCEDURE — 90471 IMMUNIZATION ADMIN: CPT | Performed by: OBSTETRICS & GYNECOLOGY

## 2023-05-23 PROCEDURE — 90651 9VHPV VACCINE 2/3 DOSE IM: CPT | Performed by: OBSTETRICS & GYNECOLOGY

## 2023-06-06 RX ORDER — TOPIRAMATE 25 MG/1
CAPSULE, COATED PELLETS ORAL
Qty: 180 CAPSULE | Refills: 1 | OUTPATIENT
Start: 2023-06-06

## 2023-06-08 RX ORDER — NORTRIPTYLINE HYDROCHLORIDE 25 MG/1
CAPSULE ORAL
Qty: 90 CAPSULE | Refills: 0 | Status: SHIPPED | OUTPATIENT
Start: 2023-06-08

## 2023-09-14 RX ORDER — NORTRIPTYLINE HYDROCHLORIDE 25 MG/1
25 CAPSULE ORAL NIGHTLY
Qty: 90 CAPSULE | Refills: 0 | Status: SHIPPED | OUTPATIENT
Start: 2023-09-14

## 2023-09-26 ENCOUNTER — NURSE ONLY (OUTPATIENT)
Dept: OBGYN CLINIC | Facility: CLINIC | Age: 33
End: 2023-09-26

## 2023-09-26 DIAGNOSIS — Z23 NEED FOR HPV VACCINATION: Primary | ICD-10-CM

## 2023-09-26 PROCEDURE — 90471 IMMUNIZATION ADMIN: CPT | Performed by: OBSTETRICS & GYNECOLOGY

## 2023-09-26 PROCEDURE — 90651 9VHPV VACCINE 2/3 DOSE IM: CPT | Performed by: OBSTETRICS & GYNECOLOGY

## 2023-10-19 ENCOUNTER — TELEPHONE (OUTPATIENT)
Dept: FAMILY MEDICINE CLINIC | Facility: CLINIC | Age: 33
End: 2023-10-19

## 2023-10-19 NOTE — TELEPHONE ENCOUNTER
Refill request for:    Requested Prescriptions     Pending Prescriptions Disp Refills    Cholecalciferol (VITAMIN D3) 250 MCG (56647 UT) Oral Cap 30 capsule 0     Sig: Take 1 capsule by mouth daily.        Last Prescribed Quantity Refills          LOV 12/6/2022     Patient was asked to follow-up in: no follow ups on file.    Appointment scheduled: Visit date not found    Medication not on protocol.     Called pt to know if she still taking medication or not since it show on pt chart she stop taking med since 12/6/22.    Wait till pt pt calls back.    Routed to front office.

## 2023-10-19 NOTE — TELEPHONE ENCOUNTER
Received refill request for Ergocalciferol medication. Pt chart shows she stop taking med since 12/2/22. Called pt to ask she is want to start taking med or not. No answer, asked pt to call back.

## 2023-12-19 NOTE — TELEPHONE ENCOUNTER
Refill request for:    Requested Prescriptions     Pending Prescriptions Disp Refills    NORTRIPTYLINE 25 MG Oral Cap [Pharmacy Med Name: NORTRIPTYLINE HCL 25 MG CAP] 90 capsule 0     Sig: TAKE 1 CAPSULE BY MOUTH EVERY DAY NIGHTLY        Last Prescribed Quantity Refills   09/14/23 90 caps 0     LOV Visit date not found     Patient was asked to follow-up in: not listed       Appointment scheduled: Visit date not found    Medication not on protocol.      # 90 with 0 refills routed to Michael, DO for review

## 2023-12-21 RX ORDER — NORTRIPTYLINE HYDROCHLORIDE 25 MG/1
25 CAPSULE ORAL NIGHTLY
Qty: 90 CAPSULE | Refills: 0 | OUTPATIENT
Start: 2023-12-21

## 2023-12-21 NOTE — TELEPHONE ENCOUNTER
LAst seen by Raymundo James 1 year ago, no follow up since prescribing med. Has appt with another [facility] med office next month. Declining refill for now until we have more info.

## 2024-01-18 ENCOUNTER — OFFICE VISIT (OUTPATIENT)
Dept: FAMILY MEDICINE CLINIC | Facility: CLINIC | Age: 34
End: 2024-01-18
Payer: COMMERCIAL

## 2024-01-18 VITALS
DIASTOLIC BLOOD PRESSURE: 74 MMHG | SYSTOLIC BLOOD PRESSURE: 112 MMHG | HEART RATE: 96 BPM | OXYGEN SATURATION: 99 % | HEIGHT: 64.5 IN | BODY MASS INDEX: 34.51 KG/M2 | WEIGHT: 204.63 LBS | TEMPERATURE: 98 F

## 2024-01-18 DIAGNOSIS — R00.2 INTERMITTENT PALPITATIONS: ICD-10-CM

## 2024-01-18 DIAGNOSIS — R10.13 DYSPEPSIA: ICD-10-CM

## 2024-01-18 DIAGNOSIS — M54.41 CHRONIC BILATERAL LOW BACK PAIN WITH RIGHT-SIDED SCIATICA: ICD-10-CM

## 2024-01-18 DIAGNOSIS — F41.9 ANXIETY: ICD-10-CM

## 2024-01-18 DIAGNOSIS — G89.29 CHRONIC BILATERAL LOW BACK PAIN WITH RIGHT-SIDED SCIATICA: ICD-10-CM

## 2024-01-18 DIAGNOSIS — G43.E01 CHRONIC MIGRAINE WITH AURA AND WITH STATUS MIGRAINOSUS, NOT INTRACTABLE: ICD-10-CM

## 2024-01-18 DIAGNOSIS — Z00.00 WELL ADULT EXAM: Primary | ICD-10-CM

## 2024-01-18 PROCEDURE — 3074F SYST BP LT 130 MM HG: CPT | Performed by: STUDENT IN AN ORGANIZED HEALTH CARE EDUCATION/TRAINING PROGRAM

## 2024-01-18 PROCEDURE — 99385 PREV VISIT NEW AGE 18-39: CPT | Performed by: STUDENT IN AN ORGANIZED HEALTH CARE EDUCATION/TRAINING PROGRAM

## 2024-01-18 PROCEDURE — 99203 OFFICE O/P NEW LOW 30 MIN: CPT | Performed by: STUDENT IN AN ORGANIZED HEALTH CARE EDUCATION/TRAINING PROGRAM

## 2024-01-18 PROCEDURE — 3078F DIAST BP <80 MM HG: CPT | Performed by: STUDENT IN AN ORGANIZED HEALTH CARE EDUCATION/TRAINING PROGRAM

## 2024-01-18 PROCEDURE — 3008F BODY MASS INDEX DOCD: CPT | Performed by: STUDENT IN AN ORGANIZED HEALTH CARE EDUCATION/TRAINING PROGRAM

## 2024-01-18 RX ORDER — CYCLOBENZAPRINE HCL 10 MG
10 TABLET ORAL 3 TIMES DAILY PRN
Qty: 90 TABLET | Refills: 0 | Status: SHIPPED | OUTPATIENT
Start: 2024-01-18

## 2024-01-18 RX ORDER — PROPRANOLOL HCL 60 MG
1 CAPSULE, EXTENDED RELEASE 24HR ORAL DAILY
Qty: 90 CAPSULE | Refills: 1 | Status: SHIPPED | OUTPATIENT
Start: 2024-01-18

## 2024-01-18 RX ORDER — ALPRAZOLAM 0.25 MG/1
0.25 TABLET ORAL NIGHTLY PRN
Qty: 30 TABLET | Refills: 0 | Status: SHIPPED | OUTPATIENT
Start: 2024-01-18

## 2024-01-18 RX ORDER — SUMATRIPTAN 50 MG/1
50 TABLET, FILM COATED ORAL EVERY 2 HOUR PRN
Qty: 30 TABLET | Refills: 0 | Status: SHIPPED | OUTPATIENT
Start: 2024-01-18

## 2024-01-18 RX ORDER — METHOCARBAMOL 750 MG/1
750 TABLET, FILM COATED ORAL 4 TIMES DAILY PRN
Qty: 60 TABLET | Refills: 0 | Status: SHIPPED | OUTPATIENT
Start: 2024-01-18

## 2024-01-18 RX ORDER — NORTRIPTYLINE HYDROCHLORIDE 10 MG/1
10 CAPSULE ORAL NIGHTLY
Qty: 30 CAPSULE | Refills: 0 | Status: SHIPPED | OUTPATIENT
Start: 2024-01-18

## 2024-01-18 NOTE — PROGRESS NOTES
HPI:    Patient ID: Leena Norman is a 33 year old female.    HPI  Pt presenting for routine physical exam. No significant chronic medical problems. Past medical/surgical history, family history, and social history were reviewed.     H/o migraines  Nortryptline, topiramate x2yrs  Concurrent propranolol, ran out 2 weeks ago  Daily migraines that flare at end of the day  Stabbing temporal pain, one-sided  Intermittent nausea, dizziness, aura?  Prior Botox with improvement (6months duration)  Notes worsened symptoms with brief Nortriptyline lapse  Increased palpitations while off propranolol    Works as surg Tech  Hydrating as able  Sleep: 7-8 hours  No snoring, restlessness  Caffeine: 1 cup daily  Stress: steady    H/o anxiety  Previously seen by therapist  Random flares  Triggers include work, flying  Denies SH/SI/HI    Moved from [place]  Fell onto buttocks 2019  Chronic low back pain since that time  Flexeril PRN  S/p physical therapy  Brother is PT    Reports episodic epigastric flare, approx [id number]  3 days duration, no improvement with Tums  Eats spicy food regularly    Review of Systems   A comprehensive 10 point review of systems was completed.  Pertinent positives and negatives noted in the the HPI.       Current Outpatient Medications   Medication Sig Dispense Refill    SUMAtriptan Succinate 50 MG Oral Tab Take 1 tablet (50 mg total) by mouth every 2 (two) hours as needed for Migraine. 30 tablet 0    nortriptyline 10 MG Oral Cap Take 1 capsule (10 mg total) by mouth nightly. 30 capsule 0    cyclobenzaprine 10 MG Oral Tab Take 1 tablet (10 mg total) by mouth 3 (three) times daily as needed for Muscle spasms. 90 tablet 0    methocarbamol 750 MG Oral Tab Take 1 tablet (750 mg total) by mouth 4 (four) times daily as needed (pain). 60 tablet 0    Propranolol HCl ER 60 MG Oral Capsule SR 24 Hr Take 1 capsule (60 mg total) by mouth daily. 90 capsule 1    ALPRAZolam (XANAX) 0.25 MG Oral Tab Take 1 tablet (0.25  mg total) by mouth nightly as needed for Anxiety. To take once per day as needed for anxiety. Can cause sedation/ fatigue. 30 tablet 0    nortriptyline 25 MG Oral Cap Take 1 capsule (25 mg total) by mouth nightly. 90 capsule 0    albuterol (PROAIR HFA) 108 (90 Base) MCG/ACT Inhalation Aero Soln Inhale 2 puffs into the lungs every 4 (four) hours as needed for Wheezing or Shortness of Breath. 8.5 each 2    Topiramate 25 MG Oral Capsule Sprinkle Take 2 capsules (50 mg total) by mouth daily. 180 capsule 1    ergocalciferol 1.25 MG (69875 UT) Oral Cap Take 1 capsule (50,000 Units total) by mouth once a week. 12 capsule 0    MAGNESIUM GLYCINATE OR Take 1 tablet by mouth daily.      Cholecalciferol (VITAMIN D3) 250 MCG (73472 UT) Oral Cap Take 1 capsule by mouth daily.      Zinc 30 MG Oral Tab Take 2 tablets by mouth daily. (Patient not taking: Reported on 1/18/2024)       Allergies:No Known Allergies   Vitals:    01/18/24 1552   BP: 112/74   Pulse: 96   Temp: 98.2 °F (36.8 °C)   TempSrc: Temporal   SpO2: 99%   Weight: 204 lb 9.6 oz (92.8 kg)   Height: 5' 4.5\" (1.638 m)       Body mass index is 34.58 kg/m².   PHYSICAL EXAM:   Physical Exam  Vitals reviewed.   Constitutional:       General: She is not in acute distress.     Appearance: Normal appearance. She is well-developed.   HENT:      Head: Normocephalic and atraumatic.      Right Ear: Tympanic membrane, ear canal and external ear normal.      Left Ear: Tympanic membrane, ear canal and external ear normal.   Eyes:      Conjunctiva/sclera: Conjunctivae normal.   Neck:      Thyroid: No thyroid mass or thyroid tenderness.   Cardiovascular:      Rate and Rhythm: Normal rate and regular rhythm.      Pulses: Normal pulses.      Heart sounds: Normal heart sounds, S1 normal and S2 normal. No murmur heard.  Pulmonary:      Effort: Pulmonary effort is normal. No respiratory distress.      Breath sounds: Normal breath sounds. No wheezing, rhonchi or rales.   Abdominal:       General: Bowel sounds are normal.      Palpations: Abdomen is soft.      Tenderness: There is no abdominal tenderness. There is no guarding or rebound.   Musculoskeletal:      Cervical back: Normal range of motion and neck supple. No muscular tenderness.      Right lower leg: No edema.      Left lower leg: No edema.   Lymphadenopathy:      Cervical: No cervical adenopathy.   Skin:     General: Skin is warm and dry.      Coloration: Skin is not jaundiced.   Neurological:      General: No focal deficit present.      Mental Status: She is alert and oriented to person, place, and time. Mental status is at baseline.   Psychiatric:         Attention and Perception: Attention normal.         Mood and Affect: Mood normal.         Behavior: Behavior normal. Behavior is cooperative.         Cognition and Memory: Cognition normal.             ASSESSMENT/PLAN:   1. Well adult exam  Discussed preventative screenings  - Pap 12/2022  - will check labs as below  - encouraged to continue diet/exercise for overall wellness  - follow-up with eye doctor annually  - follow-up with dentist every 6 months  - return yearly for physicals  - annual flu shot  - tetanus booster every 10yrs  - TSH W Reflex To Free T4; Future  - CBC, Platelet; No Differential; Future  - Comp Metabolic Panel (14); Future  - Hemoglobin A1C; Future  - Lipid Panel; Future  - Vitamin D; Future    2. Chronic migraine with aura and with status migrainosus, not intractable  Discussed treatment options including increased hydration and sleep, vitamin supplementation, abortives  - increase hydration and rest as tolerated  - will start riboflavin and magnesium dosing as discussed  - will increase nortriptyline dosing and monitor response  - will trial triptan dosing PRN  - follow-up with Neuro for possible Botox vs other  - discussed red flags for urgent reevaluation  - to call with any questions/concerns  - SUMAtriptan Succinate 50 MG Oral Tab; Take 1 tablet (50 mg total)  by mouth every 2 (two) hours as needed for Migraine.  Dispense: 30 tablet; Refill: 0  - Neuro Referral - In Network  - nortriptyline 10 MG Oral Cap; Take 1 capsule (10 mg total) by mouth nightly.  Dispense: 30 capsule; Refill: 0  - Propranolol HCl ER 60 MG Oral Capsule SR 24 Hr; Take 1 capsule (60 mg total) by mouth daily.  Dispense: 90 capsule; Refill: 1    3. Anxiety  Discussed treatment options, including counseling, medication, combination  - to increase nortriptyline  - resume propranolol dosing  - discussed role of Xanax PRN  - to call with questions/concerns  - advised to proceed to ED if feeling unsafe at home  - nortriptyline 10 MG Oral Cap; Take 1 capsule (10 mg total) by mouth nightly.  Dispense: 30 capsule; Refill: 0  - Propranolol HCl ER 60 MG Oral Capsule SR 24 Hr; Take 1 capsule (60 mg total) by mouth daily.  Dispense: 90 capsule; Refill: 1  - ALPRAZolam (XANAX) 0.25 MG Oral Tab; Take 1 tablet (0.25 mg total) by mouth nightly as needed for Anxiety. To take once per day as needed for anxiety. Can cause sedation/ fatigue.  Dispense: 30 tablet; Refill: 0    4. Chronic bilateral low back pain with right-sided sciatica  - provided with gentle stretching/strengthening exercises  - encouraged to increase hydration and rest as able  - muscle relaxers PRN  - to call with any questions or concerns  - cyclobenzaprine 10 MG Oral Tab; Take 1 tablet (10 mg total) by mouth 3 (three) times daily as needed for Muscle spasms.  Dispense: 90 tablet; Refill: 0  - methocarbamol 750 MG Oral Tab; Take 1 tablet (750 mg total) by mouth 4 (four) times daily as needed (pain).  Dispense: 60 tablet; Refill: 0    5. Intermittent palpitations  Refilled  - Propranolol HCl ER 60 MG Oral Capsule SR 24 Hr; Take 1 capsule (60 mg total) by mouth daily.  Dispense: 90 capsule; Refill: 1    6. Dyspepsia  To follow-up with GI for progressive symptoms  - Gastro Referral - [place] ([facility])    Pt verbalized understanding and agrees  with plan.    Orders Placed This Encounter   Procedures    TSH W Reflex To Free T4    CBC, Platelet; No Differential    Comp Metabolic Panel (14)    Hemoglobin A1C    Lipid Panel    Vitamin D       Meds This Visit:  Requested Prescriptions     Signed Prescriptions Disp Refills    SUMAtriptan Succinate 50 MG Oral Tab 30 tablet 0     Sig: Take 1 tablet (50 mg total) by mouth every 2 (two) hours as needed for Migraine.    nortriptyline 10 MG Oral Cap 30 capsule 0     Sig: Take 1 capsule (10 mg total) by mouth nightly.    cyclobenzaprine 10 MG Oral Tab 90 tablet 0     Sig: Take 1 tablet (10 mg total) by mouth 3 (three) times daily as needed for Muscle spasms.    methocarbamol 750 MG Oral Tab 60 tablet 0     Sig: Take 1 tablet (750 mg total) by mouth 4 (four) times daily as needed (pain).    Propranolol HCl ER 60 MG Oral Capsule SR 24 Hr 90 capsule 1     Sig: Take 1 capsule (60 mg total) by mouth daily.    ALPRAZolam (XANAX) 0.25 MG Oral Tab 30 tablet 0     Sig: Take 1 tablet (0.25 mg total) by mouth nightly as needed for Anxiety. To take once per day as needed for anxiety. Can cause sedation/ fatigue.       Imaging & Referrals:  NEURO - INTERNAL  GASTRO - INTERNAL         ID#[id number]

## 2024-02-03 DIAGNOSIS — G89.29 CHRONIC BILATERAL LOW BACK PAIN WITH RIGHT-SIDED SCIATICA: ICD-10-CM

## 2024-02-03 DIAGNOSIS — M54.41 CHRONIC BILATERAL LOW BACK PAIN WITH RIGHT-SIDED SCIATICA: ICD-10-CM

## 2024-02-05 RX ORDER — METHOCARBAMOL 750 MG/1
750 TABLET, FILM COATED ORAL 4 TIMES DAILY PRN
Qty: 60 TABLET | Refills: 3 | Status: SHIPPED | OUTPATIENT
Start: 2024-02-05

## 2024-02-05 NOTE — TELEPHONE ENCOUNTER
Protocol Failed/ No Protocol    Requested Prescriptions   Pending Prescriptions Disp Refills    METHOCARBAMOL 750 MG Oral Tab [Pharmacy Med Name: METHOCARBAMOL 750 MG TABLET] 60 tablet 0     Sig: Take 1 tablet (750 mg total) by mouth 4 (four) times daily as needed (pain).       There is no refill protocol information for this order            Recent Outpatient Visits              2 weeks ago Well adult exam    [facility], [place] Christa Ellis MD    Office Visit    4 months ago Need for HPV vaccination    [address]    Nurse Only    8 months ago HPV exposure    [address]    Nurse Only    9 months ago Skin lesion    [address] Va Borrero MD    Office Visit    11 months ago HPV exposure    [address] [place] Va Borrero MD    Office Visit

## 2024-02-08 DIAGNOSIS — G43.E01 CHRONIC MIGRAINE WITH AURA AND WITH STATUS MIGRAINOSUS, NOT INTRACTABLE: ICD-10-CM

## 2024-02-08 DIAGNOSIS — F41.9 ANXIETY: ICD-10-CM

## 2024-02-09 RX ORDER — NORTRIPTYLINE HYDROCHLORIDE 10 MG/1
10 CAPSULE ORAL NIGHTLY
Qty: 90 CAPSULE | Refills: 3 | Status: SHIPPED | OUTPATIENT
Start: 2024-02-09

## 2024-02-09 NOTE — TELEPHONE ENCOUNTER
Please review; protocol failed/ has no protocol    Requested Prescriptions   Pending Prescriptions Disp Refills    NORTRIPTYLINE 10 MG Oral Cap [Pharmacy Med Name: NORTRIPTYLINE HCL 10 MG CAP] 30 capsule 0     Sig: Take 1 capsule (10 mg total) by mouth nightly.       There is no refill protocol information for this order        Recent Outpatient Visits              3 weeks ago Well adult exam    [facility], [facility], [place] Christa Ellis MD    Office Visit    4 months ago Need for HPV vaccination    [address]    Nurse Only    8 months ago HPV exposure    [address]    Nurse Only    10 months ago Skin lesion    [address] Va Borrero MD    Office Visit    11 months ago HPV exposure    [address] [place] Va Borrero MD    Office Visit

## 2024-02-23 RX ORDER — NORTRIPTYLINE HYDROCHLORIDE 25 MG/1
25 CAPSULE ORAL NIGHTLY
Qty: 90 CAPSULE | Refills: 0 | Status: CANCELLED | OUTPATIENT
Start: 2024-02-23

## 2024-02-26 ENCOUNTER — LAB ENCOUNTER (OUTPATIENT)
Dept: LAB | Facility: REFERENCE LAB | Age: 34
End: 2024-02-26
Attending: STUDENT IN AN ORGANIZED HEALTH CARE EDUCATION/TRAINING PROGRAM
Payer: COMMERCIAL

## 2024-02-26 DIAGNOSIS — Z00.00 WELL ADULT EXAM: ICD-10-CM

## 2024-02-26 LAB
ALBUMIN SERPL-MCNC: 4.3 G/DL (ref 3.2–4.8)
ALBUMIN/GLOB SERPL: 1.7 {RATIO} (ref 1–2)
ALP LIVER SERPL-CCNC: 77 U/L
ALT SERPL-CCNC: 13 U/L
ANION GAP SERPL CALC-SCNC: 7 MMOL/L (ref 0–18)
AST SERPL-CCNC: 16 U/L (ref ?–34)
BILIRUB SERPL-MCNC: 0.2 MG/DL (ref 0.3–1.2)
BUN BLD-MCNC: 10 MG/DL (ref 9–23)
BUN/CREAT SERPL: 10.5 (ref 10–20)
CALCIUM BLD-MCNC: 9.6 MG/DL (ref 8.7–10.4)
CHLORIDE SERPL-SCNC: 106 MMOL/L (ref 98–112)
CHOLEST SERPL-MCNC: 209 MG/DL (ref ?–200)
CO2 SERPL-SCNC: 28 MMOL/L (ref 21–32)
CREAT BLD-MCNC: 0.95 MG/DL
DEPRECATED RDW RBC AUTO: 36.8 FL (ref 35.1–46.3)
EGFRCR SERPLBLD CKD-EPI 2021: 81 ML/MIN/1.73M2 (ref 60–?)
ERYTHROCYTE [DISTWIDTH] IN BLOOD BY AUTOMATED COUNT: 12.6 % (ref 11–15)
EST. AVERAGE GLUCOSE BLD GHB EST-MCNC: 120 MG/DL (ref 68–126)
FASTING PATIENT LIPID ANSWER: NO
FASTING STATUS PATIENT QL REPORTED: NO
GLOBULIN PLAS-MCNC: 2.6 G/DL (ref 2.8–4.4)
GLUCOSE BLD-MCNC: 97 MG/DL (ref 70–99)
HBA1C MFR BLD: 5.8 % (ref ?–5.7)
HCT VFR BLD AUTO: 36.4 %
HDLC SERPL-MCNC: 52 MG/DL (ref 40–59)
HGB BLD-MCNC: 12.2 G/DL
LDLC SERPL CALC-MCNC: 107 MG/DL (ref ?–100)
MCH RBC QN AUTO: 27.4 PG (ref 26–34)
MCHC RBC AUTO-ENTMCNC: 33.5 G/DL (ref 31–37)
MCV RBC AUTO: 81.6 FL
NONHDLC SERPL-MCNC: 157 MG/DL (ref ?–130)
OSMOLALITY SERPL CALC.SUM OF ELEC: 291 MOSM/KG (ref 275–295)
PLATELET # BLD AUTO: 323 10(3)UL (ref 150–450)
POTASSIUM SERPL-SCNC: 4 MMOL/L (ref 3.5–5.1)
PROT SERPL-MCNC: 6.9 G/DL (ref 5.7–8.2)
RBC # BLD AUTO: 4.46 X10(6)UL
SODIUM SERPL-SCNC: 141 MMOL/L (ref 136–145)
TRIGL SERPL-MCNC: 293 MG/DL (ref 30–149)
TSI SER-ACNC: 3.75 MIU/ML (ref 0.55–4.78)
VIT D+METAB SERPL-MCNC: 16.6 NG/ML (ref 30–100)
VLDLC SERPL CALC-MCNC: 50 MG/DL (ref 0–30)
WBC # BLD AUTO: 11.1 X10(3) UL (ref 4–11)

## 2024-02-26 PROCEDURE — 85027 COMPLETE CBC AUTOMATED: CPT

## 2024-02-26 PROCEDURE — 80061 LIPID PANEL: CPT

## 2024-02-26 PROCEDURE — 80053 COMPREHEN METABOLIC PANEL: CPT

## 2024-02-26 PROCEDURE — 36415 COLL VENOUS BLD VENIPUNCTURE: CPT

## 2024-02-26 PROCEDURE — 84443 ASSAY THYROID STIM HORMONE: CPT

## 2024-02-26 PROCEDURE — 82306 VITAMIN D 25 HYDROXY: CPT

## 2024-02-26 PROCEDURE — 83036 HEMOGLOBIN GLYCOSYLATED A1C: CPT

## 2024-02-26 NOTE — TELEPHONE ENCOUNTER
Refill request for:    Requested Prescriptions     Pending Prescriptions Disp Refills    nortriptyline 25 MG Oral Cap 90 capsule 0     Sig: Take 1 capsule (25 mg total) by mouth nightly.        Last Prescribed Quantity Refills   09/14/2023 90 1     LOV Visit date not found     Patient was asked to follow-up in:     Appointment due:     Appointment scheduled: Visit date not found    Medication not on protocol.     Routed to . Pt is no longer our pt her pcp is Christa doe

## 2024-02-28 ENCOUNTER — PATIENT MESSAGE (OUTPATIENT)
Dept: FAMILY MEDICINE CLINIC | Facility: CLINIC | Age: 34
End: 2024-02-28

## 2024-03-03 ENCOUNTER — PATIENT MESSAGE (OUTPATIENT)
Dept: FAMILY MEDICINE CLINIC | Facility: CLINIC | Age: 34
End: 2024-03-03

## 2024-03-04 NOTE — TELEPHONE ENCOUNTER
From: Leena Norman  To: Christa Ellis  Sent: 3/3/2024 10:03 AM CST  Subject: Refill for nortryptaline     Hello I tried to request a refill for nortryptaline 25mg but it went through my previous pcp. How do I do it through you?

## 2024-03-05 RX ORDER — NORTRIPTYLINE HYDROCHLORIDE 25 MG/1
25 CAPSULE ORAL NIGHTLY
Qty: 90 CAPSULE | Refills: 3 | Status: SHIPPED | OUTPATIENT
Start: 2024-03-05

## 2024-03-05 NOTE — TELEPHONE ENCOUNTER
Please review; protocol failed/No Protocol    Last written by previous PCP    Office note from 01/18/2024  2. Chronic migraine with aura and with status migrainosus, not intractable  Discussed treatment options including increased hydration and sleep, vitamin supplementation, abortives  - increase hydration and rest as tolerated  - will start riboflavin and magnesium dosing as discussed  - will increase nortriptyline dosing and monitor response    Pended script with new directions of adding the 10mg along with the 25mg at night for a total daily dose of 35mg.-if appropriate?       Requested Prescriptions   Pending Prescriptions Disp Refills    nortriptyline 25 MG Oral Cap 90 capsule 0     Sig: Take 1 capsule (25 mg total) by mouth nightly.       There is no refill protocol information for this order          Recent Outpatient Visits              1 month ago Well adult exam    [facility], [facility] [place]    Office Visit    5 months ago Need for HPV vaccination    [facility], [address] [place] [address]    Nurse Only    9 months ago HPV exposure    [address]    Nurse Only    10 months ago Skin lesion    [address] Va Borrero MD    Office Visit    1 year ago HPV exposure    [address] [place] Va Borrero MD    Office Visit

## 2024-03-11 DIAGNOSIS — M54.41 CHRONIC BILATERAL LOW BACK PAIN WITH RIGHT-SIDED SCIATICA: ICD-10-CM

## 2024-03-11 DIAGNOSIS — G89.29 CHRONIC BILATERAL LOW BACK PAIN WITH RIGHT-SIDED SCIATICA: ICD-10-CM

## 2024-03-11 RX ORDER — CYCLOBENZAPRINE HCL 10 MG
10 TABLET ORAL 3 TIMES DAILY PRN
Qty: 90 TABLET | Refills: 3 | Status: SHIPPED | OUTPATIENT
Start: 2024-03-11

## 2024-03-11 NOTE — TELEPHONE ENCOUNTER
Please review, protocol failed/No protocol.    Requested Prescriptions   Pending Prescriptions Disp Refills    CYCLOBENZAPRINE 10 MG Oral Tab [Pharmacy Med Name: CYCLOBENZAPRINE 10 MG TABLET] 90 tablet 0     Sig: TAKE 1 TABLET BY MOUTH THREE TIMES A DAY AS NEEDED FOR MUSCLE SPASMS       There is no refill protocol information for this order          Future Appointments         Provider Department Appt Notes    In 3 months Van Marie,  [facility] Persistent headaches          Recent Outpatient Visits              1 month ago Well adult exam    [facility] Christa Ellis MD    Office Visit    5 months ago Need for HPV vaccination    [address]    Nurse Only    9 months ago HPV exposure    [address]    Nurse Only    11 months ago Skin lesion    [address] Va Borrero MD    Office Visit    1 year ago HPV exposure    [address] [place] Borrero MD    Office Visit

## 2024-04-26 ENCOUNTER — HOSPITAL ENCOUNTER (EMERGENCY)
Facility: HOSPITAL | Age: 34
Discharge: HOME | End: 2024-04-26
Attending: EMERGENCY MEDICINE
Payer: COMMERCIAL

## 2024-04-26 ENCOUNTER — APPOINTMENT (OUTPATIENT)
Dept: CT IMAGING | Facility: HOSPITAL | Age: 34
End: 2024-04-26
Attending: EMERGENCY MEDICINE
Payer: COMMERCIAL

## 2024-04-26 VITALS
BODY MASS INDEX: 34.15 KG/M2 | OXYGEN SATURATION: 99 % | RESPIRATION RATE: 18 BRPM | SYSTOLIC BLOOD PRESSURE: 111 MMHG | TEMPERATURE: 98 F | DIASTOLIC BLOOD PRESSURE: 74 MMHG | HEART RATE: 77 BPM | HEIGHT: 64 IN | WEIGHT: 200 LBS

## 2024-04-26 DIAGNOSIS — K52.9 ENTEROCOLITIS: Primary | ICD-10-CM

## 2024-04-26 LAB
ALBUMIN SERPL-MCNC: 4.6 G/DL (ref 3.2–4.8)
ALBUMIN/GLOB SERPL: 1.6 {RATIO} (ref 1–2)
ALP LIVER SERPL-CCNC: 85 U/L
ALT SERPL-CCNC: 20 U/L
ANION GAP SERPL CALC-SCNC: 6 MMOL/L (ref 0–18)
AST SERPL-CCNC: 13 U/L (ref ?–34)
B-HCG UR QL: NEGATIVE
BASOPHILS # BLD AUTO: 0.06 X10(3) UL (ref 0–0.2)
BASOPHILS NFR BLD AUTO: 0.4 %
BILIRUB SERPL-MCNC: 0.4 MG/DL (ref 0.3–1.2)
BILIRUB UR QL: NEGATIVE
BUN BLD-MCNC: 9 MG/DL (ref 9–23)
BUN/CREAT SERPL: 11.1 (ref 10–20)
CALCIUM BLD-MCNC: 10.2 MG/DL (ref 8.7–10.4)
CHLORIDE SERPL-SCNC: 106 MMOL/L (ref 98–112)
CLARITY UR: CLEAR
CO2 SERPL-SCNC: 28 MMOL/L (ref 21–32)
COLOR UR: YELLOW
CREAT BLD-MCNC: 0.81 MG/DL
DEPRECATED RDW RBC AUTO: 37.2 FL (ref 35.1–46.3)
EGFRCR SERPLBLD CKD-EPI 2021: 98 ML/MIN/1.73M2 (ref 60–?)
EOSINOPHIL # BLD AUTO: 0.43 X10(3) UL (ref 0–0.7)
EOSINOPHIL NFR BLD AUTO: 3 %
ERYTHROCYTE [DISTWIDTH] IN BLOOD BY AUTOMATED COUNT: 12.9 % (ref 11–15)
GLOBULIN PLAS-MCNC: 2.8 G/DL (ref 2.8–4.4)
GLUCOSE BLD-MCNC: 113 MG/DL (ref 70–99)
GLUCOSE UR-MCNC: NORMAL MG/DL
HCT VFR BLD AUTO: 39.6 %
HGB BLD-MCNC: 13.3 G/DL
IMM GRANULOCYTES # BLD AUTO: 0.04 X10(3) UL (ref 0–1)
IMM GRANULOCYTES NFR BLD: 0.3 %
LEUKOCYTE ESTERASE UR QL STRIP.AUTO: 25
LIPASE SERPL-CCNC: 36 U/L (ref 13–75)
LYMPHOCYTES # BLD AUTO: 3.22 X10(3) UL (ref 1–4)
LYMPHOCYTES NFR BLD AUTO: 22.3 %
MCH RBC QN AUTO: 27 PG (ref 26–34)
MCHC RBC AUTO-ENTMCNC: 33.6 G/DL (ref 31–37)
MCV RBC AUTO: 80.5 FL
MONOCYTES # BLD AUTO: 0.69 X10(3) UL (ref 0.1–1)
MONOCYTES NFR BLD AUTO: 4.8 %
NEUTROPHILS # BLD AUTO: 9.98 X10 (3) UL (ref 1.5–7.7)
NEUTROPHILS # BLD AUTO: 9.98 X10(3) UL (ref 1.5–7.7)
NEUTROPHILS NFR BLD AUTO: 69.2 %
NITRITE UR QL STRIP.AUTO: NEGATIVE
OSMOLALITY SERPL CALC.SUM OF ELEC: 289 MOSM/KG (ref 275–295)
PH UR: 8 [PH] (ref 5–8)
PLATELET # BLD AUTO: 368 10(3)UL (ref 150–450)
POTASSIUM SERPL-SCNC: 3.8 MMOL/L (ref 3.5–5.1)
PROT SERPL-MCNC: 7.4 G/DL (ref 5.7–8.2)
PROT UR-MCNC: 20 MG/DL
RBC # BLD AUTO: 4.92 X10(6)UL
RBC #/AREA URNS AUTO: >10 /HPF
SODIUM SERPL-SCNC: 140 MMOL/L (ref 136–145)
SP GR UR STRIP: 1.03 (ref 1–1.03)
UROBILINOGEN UR STRIP-ACNC: NORMAL
WBC # BLD AUTO: 14.4 X10(3) UL (ref 4–11)

## 2024-04-26 PROCEDURE — 81001 URINALYSIS AUTO W/SCOPE: CPT | Performed by: EMERGENCY MEDICINE

## 2024-04-26 PROCEDURE — 83690 ASSAY OF LIPASE: CPT | Performed by: EMERGENCY MEDICINE

## 2024-04-26 PROCEDURE — 96375 TX/PRO/DX INJ NEW DRUG ADDON: CPT

## 2024-04-26 PROCEDURE — 80053 COMPREHEN METABOLIC PANEL: CPT | Performed by: EMERGENCY MEDICINE

## 2024-04-26 PROCEDURE — 99285 EMERGENCY DEPT VISIT HI MDM: CPT

## 2024-04-26 PROCEDURE — 81025 URINE PREGNANCY TEST: CPT

## 2024-04-26 PROCEDURE — 96361 HYDRATE IV INFUSION ADD-ON: CPT

## 2024-04-26 PROCEDURE — 87086 URINE CULTURE/COLONY COUNT: CPT | Performed by: EMERGENCY MEDICINE

## 2024-04-26 PROCEDURE — 85025 COMPLETE CBC W/AUTO DIFF WBC: CPT | Performed by: EMERGENCY MEDICINE

## 2024-04-26 PROCEDURE — 74177 CT ABD & PELVIS W/CONTRAST: CPT | Performed by: EMERGENCY MEDICINE

## 2024-04-26 PROCEDURE — S0028 INJECTION, FAMOTIDINE, 20 MG: HCPCS | Performed by: EMERGENCY MEDICINE

## 2024-04-26 PROCEDURE — 99284 EMERGENCY DEPT VISIT MOD MDM: CPT

## 2024-04-26 PROCEDURE — 96374 THER/PROPH/DIAG INJ IV PUSH: CPT

## 2024-04-26 RX ORDER — ONDANSETRON 2 MG/ML
4 INJECTION INTRAMUSCULAR; INTRAVENOUS ONCE
Status: COMPLETED | OUTPATIENT
Start: 2024-04-26 | End: 2024-04-26

## 2024-04-26 RX ORDER — PANTOPRAZOLE SODIUM 40 MG/1
40 TABLET, DELAYED RELEASE ORAL DAILY
Qty: 30 TABLET | Refills: 0 | Status: SHIPPED | OUTPATIENT
Start: 2024-04-26 | End: 2024-05-26

## 2024-04-26 RX ORDER — ONDANSETRON 4 MG/1
4 TABLET, ORALLY DISINTEGRATING ORAL EVERY 4 HOURS PRN
Qty: 12 TABLET | Refills: 0 | Status: SHIPPED | OUTPATIENT
Start: 2024-04-26 | End: 2024-05-03

## 2024-04-26 RX ORDER — KETOROLAC TROMETHAMINE 15 MG/ML
15 INJECTION, SOLUTION INTRAMUSCULAR; INTRAVENOUS ONCE
Status: COMPLETED | OUTPATIENT
Start: 2024-04-26 | End: 2024-04-26

## 2024-04-26 RX ORDER — FAMOTIDINE 10 MG/ML
20 INJECTION, SOLUTION INTRAVENOUS ONCE
Status: COMPLETED | OUTPATIENT
Start: 2024-04-26 | End: 2024-04-26

## 2024-04-26 NOTE — ED INITIAL ASSESSMENT (HPI)
Pt ambulatory through triage c/o non-focused abd pain starting ~1130. Endorses nausea, denies vomiting or BM. Denies urinary issues. Pt states she had been taking ozempic for 5 weeks. PT stopped 2 weeks ago. Pt took 2 gasx ~1300. Pt took laxative ~1700

## 2024-04-27 NOTE — ED QUICK NOTES
Patient cleared for discharge by MD. Belongings with patient. Patient discharge instructions reviewed with patient including when and how to follow up  with healthcare provider and when to seek medical treatment. Peripheral IV removed. Medication use and prescriptions reviewed.

## 2024-04-28 NOTE — ED PROVIDER NOTES
Patient Seen in: [facility] Emergency Department    History     Chief Complaint   Patient presents with    Abdomen/Flank Pain     Stated Complaint: Abdominal Pain    HPI    Patient complains of crampy  abdominal pain that began several days ago.  Pain described as crampy diffuse.  Pain rated as 8/10.  Modifying factors include: none.          Past Medical History:    Allergic rhinitis    Anxiety    Asthma (HCC)    Esophageal reflux    Headache    Constant headaches and migrains on a daily basis    Visual impairment       History reviewed. No pertinent surgical history.         Family History   Problem Relation Age of Onset    Cancer Mother 67        Mesothelioma    Heart Disorder Father         Due to smoking    Hypertension Father         Due to smoking       Social History     Socioeconomic History    Marital status:    Tobacco Use    Smoking status: Never    Smokeless tobacco: Never   Vaping Use    Vaping status: Never Used   Substance and Sexual Activity    Alcohol use: Not Currently     Alcohol/week: 2.0 standard drinks of alcohol     Types: 2 Glasses of wine per week     Comment: 2-3 drinks a month    Drug use: Never     Types: Cannabis     Comment: once every 3 months   Other Topics Concern    Caffeine Concern Yes    Stress Concern Yes    Weight Concern Yes    Exercise Yes    Seat Belt Yes    Self-Exams Yes     Comment: self breast exam 1-2 times a year       Review of Systems    Positive for stated complaint: Abdominal Pain  Other systems are as noted in HPI.  Constitutional and vital signs reviewed.      All other systems reviewed and negative except as noted above.    PSFH elements reviewed from today and agreed except as otherwise stated in HPI.    Physical Exam     ED Triage Vitals [04/26/24 1807]   /83   Pulse 94   Resp 20   Temp 98.1 °F (36.7 °C)   Temp src Oral   SpO2 100 %   O2 Device None (Room air)       Current:/74   Pulse 77   Temp 98.1 °F (36.7 °C) (Oral)   Resp 18    Ht 162.6 cm (5' 4\")   Wt 90.7 kg   LMP 04/25/2024 (Exact Date)   SpO2 99%   BMI 34.33 kg/m²   Pulse ox nl        Physical Exam  General Appearance: alert, no distress  Eyes: pupils equal and round no pallor or injection  ENT, Mouth: mucous membranes moist  Respiratory: there are no retractions, lungs are clear to auscultation  Cardiovascular: regular rate and rhythm    Gastrointestinal: soft, tender periumbilical into mid upper abd no guarding no peritoneal signs  Neurological: II-XII grossly intact  no focal deficits  Skin: warm and dry, no rashes.  Musculoskeletal: neck is supple non tender        Extremities are symmetrical, full range of motion  Psychiatric: patient is pleasant, there is no agitation    DIFFERENTIAL DIAGNOSIS: After history and physical exam differential diagnosis was considered for  gastritis vs. Pancreatitis vs. enteritis          ED Course     Labs Reviewed   COMP METABOLIC PANEL (14) - Abnormal; Notable for the following components:       Result Value    Glucose 113 (*)     All other components within normal limits   URINALYSIS WITH CULTURE REFLEX - Abnormal; Notable for the following components:    Ketones Urine Trace (*)     Blood Urine 3+ (*)     Protein Urine 20 (*)     Leukocyte Esterase Urine 25 (*)     RBC Urine >10 (*)     Squamous Epi. Cells Few (*)     All other components within normal limits   CBC W/ DIFFERENTIAL - Abnormal; Notable for the following components:    WBC 14.4 (*)     Neutrophil Absolute Prelim 9.98 (*)     Neutrophil Absolute 9.98 (*)     All other components within normal limits   LIPASE - Normal   POCT PREGNANCY URINE - Normal   CBC WITH DIFFERENTIAL WITH PLATELET    Narrative:     The following orders were created for panel order CBC With Differential With Platelet.  Procedure                               Abnormality         Status                     ---------                               -----------         ------                     CBC W/  DIFFERENTIAL[[id number]]          Abnormal            Final result                 Please view results for these tests on the individual orders.   RAINBOW DRAW LAVENDER   RAINBOW DRAW LIGHT GREEN   RAINBOW DRAW BLUE   RAINBOW DRAW GOLD   URINE CULTURE, ROUTINE       MDM         Cardiac Monitor:   Pulse Readings from Last 1 Encounters:   04/26/24 77   , sinus, 76  interpreted by me.    Radiology findings:  I personally reviewed the images. No results found.      CT ABDOMEN+PELVIS(CONTRAST ONLY)(CPT=74177)    Result Date: 4/26/2024  CONCLUSION:  1. Fluid-filled loops of small and large bowel, which could reflect underlying infectious/inflammatory enterocolitis or malabsorption in the appropriate clinical setting.  2. Lesser incidental findings as above.    Dictated by (CST): Tony Dillard MD on 4/26/2024 at 8:35 PM     Finalized by (CST): Tony Dillard MD on 4/26/2024 at 8:43 PM                 Medical Decision Making  Problems Addressed:  Enterocolitis: acute illness or injury    Amount and/or Complexity of Data Reviewed  Labs: ordered. Decision-making details documented in ED Course.  Radiology: ordered. Decision-making details documented in ED Course.  Discussion of management or test interpretation with external provider(s): Tylenol for pain, dietary modifications recommended.  GI recommended.    Risk  OTC drugs.  Prescription drug management.            Disposition and Plan     Clinical Impression:  1. Enterocolitis        Disposition:  Discharge    Follow-up:  Huy Covington MD  [address] [place] [id number]  [phone]    Follow up        Medications Prescribed:  Discharge Medication List as of 4/26/2024  9:13 PM        START taking these medications    Details   ondansetron 4 MG Oral Tablet Dispersible Take 1 tablet (4 mg total) by mouth every 4 (four) hours as needed for Nausea., Normal, Disp-12 tablet, R-0      pantoprazole 40 MG Oral Tab EC Take 1 tablet (40 mg total) by mouth daily.,  Normal, Disp-30 tablet, R-0

## 2024-05-14 DIAGNOSIS — F41.9 ANXIETY: ICD-10-CM

## 2024-05-14 DIAGNOSIS — G43.E01 CHRONIC MIGRAINE WITH AURA AND WITH STATUS MIGRAINOSUS, NOT INTRACTABLE: ICD-10-CM

## 2024-05-14 DIAGNOSIS — R00.2 INTERMITTENT PALPITATIONS: ICD-10-CM

## 2024-05-16 RX ORDER — PROPRANOLOL HCL 60 MG
1 CAPSULE, EXTENDED RELEASE 24HR ORAL DAILY
Qty: 90 CAPSULE | Refills: 3 | Status: SHIPPED | OUTPATIENT
Start: 2024-05-16

## 2024-05-16 NOTE — TELEPHONE ENCOUNTER
Refill passed per [facility] protocols.    Requested Prescriptions   Pending Prescriptions Disp Refills    PROPRANOLOL HCL ER 60 MG Oral Capsule SR 24 Hr [Pharmacy Med Name: PROPRANOLOL ER 60 MG CAPSULE] 90 capsule 1     Sig: TAKE 1 CAPSULE BY MOUTH EVERY DAY       Hypertension Medications Protocol Passed - 5/14/2024  5:39 PM        Passed - CMP or BMP in past 12 months        Passed - Last BP reading less than 140/90     BP Readings from Last 1 Encounters:   04/26/24 111/74               Passed - In person appointment or virtual visit in the past 12 mos or appointment in next 3 mos     Recent Outpatient Visits              3 months ago Well adult exam    [facility] Christa Ellis MD    Office Visit    7 months ago Need for HPV vaccination    [address]    Nurse Only    11 months ago HPV exposure    [address]    Nurse Only    1 year ago Skin lesion    [address] aV Borrero MD    Office Visit    1 year ago HPV exposure    [address] Va Borrero MD    Office Visit          Future Appointments         Provider Department Appt Notes    In 1 month Van Marie,  [facility] Persistent headaches                    Passed - EGFRCR or GFRNAA > 50     GFR Evaluation  EGFRCR: 98 , resulted on 4/26/2024

## 2024-06-19 ENCOUNTER — TELEPHONE (OUTPATIENT)
Dept: PHYSICAL MEDICINE AND REHAB | Facility: CLINIC | Age: 34
End: 2024-06-19

## 2024-06-19 ENCOUNTER — OFFICE VISIT (OUTPATIENT)
Dept: NEUROLOGY | Facility: CLINIC | Age: 34
End: 2024-06-19

## 2024-06-19 VITALS — BODY MASS INDEX: 34.15 KG/M2 | HEIGHT: 64 IN | WEIGHT: 200 LBS

## 2024-06-19 DIAGNOSIS — G43.E09 CHRONIC MIGRAINE WITH AURA WITHOUT STATUS MIGRAINOSUS, NOT INTRACTABLE: Primary | ICD-10-CM

## 2024-06-19 DIAGNOSIS — M54.16 RIGHT LUMBAR RADICULOPATHY: ICD-10-CM

## 2024-06-19 RX ORDER — KETOROLAC TROMETHAMINE 30 MG/ML
30 INJECTION, SOLUTION INTRAMUSCULAR; INTRAVENOUS ONCE
Status: COMPLETED | OUTPATIENT
Start: 2024-06-19 | End: 2024-06-19

## 2024-06-19 RX ORDER — RIMEGEPANT SULFATE 75 MG/75MG
75 TABLET, ORALLY DISINTEGRATING ORAL AS NEEDED
Qty: 8 TABLET | Refills: 11 | Status: SHIPPED | OUTPATIENT
Start: 2024-06-19 | End: 2025-06-19

## 2024-06-19 RX ORDER — PREDNISONE 20 MG/1
TABLET ORAL
Qty: 24 TABLET | Refills: 0 | Status: SHIPPED | OUTPATIENT
Start: 2024-06-19 | End: 2024-06-30

## 2024-06-19 RX ADMIN — KETOROLAC TROMETHAMINE 30 MG: 30 INJECTION, SOLUTION INTRAMUSCULAR; INTRAVENOUS at 15:29:00

## 2024-06-19 NOTE — PROGRESS NOTES
[facility]  [address], [address]  [facility] [id number] [phone]          [facility]  NEUROLOGY VISIT FOR HEADACHE  Reason for Admission/Consultation: headache; migraine headaches  Requested by: Christa Ellis MD  [address] [place] [address]    PCP: Christa Ellis MD  Chief Complaint: New Patient (NPT presents to establish care for HA's and migraines. States that she gets daily HA's that will last all day and migraines 3-4 ties per week. Migraines are located behind her eye and then radiates to the frontal lobe. Pt is currently taking Propranolol and Nortriptyline and feels that it does not help. Pt would like to discuss alternate therapy. Imaging has not been completed.)      HPI:  Leena Norman is a 33 year old female  w/ a pmhx of allergic rhinitis, anxiety,?  Asthma, GERD, chronic migraine, and visual impairment, who presents for  daily  migraine HA without aura and right lumbar radiculopathy.     She fell in 2019   Last year she developed low back pain that radiates down the anterior surface of her leg.  No fall since then  Her right legs weak  She has sharp pains and cannot move   She will have weakness in her knee    No changes in bowel/bladder patterns.      On average, there are currently 0 crystal clear days per month (days without any headache or migraine symptoms, including aura, postdrome, prodrome, etc).    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  Has been affecting her quality of life.  Affects her productivity at home.  Are the headaches occurring more often or becoming more severe?  She has had daily headaches for at least a year now.  Symptom progression in past six months: Stable but bothersome/disabling.    Headache Description  How does the headache begin?     Intensity  (overall): 10/10 on 0-10 scale (10 being most painful)   Location: frontal region, temporal region, cervical region, bilateral  Side locked: No. Occur on the bilaterally.  Frequency: Daily, see above.  Duration:  at least 4 hrs.  Character:  pounding, pulsating, and throbbing  Associated Symptoms:  photophobia, phonophobia, nausea, and dizziness  Autonomic Symptoms: denies any autonomic symptoms   Precipitating Factors: No clear precipitating factors.  She has daily migraines.  Most common time of day for the headache to begin: Wakes up with migraine headaches.  Does the headache wake you from sleep? No    Prodrome Symptoms:   Are the headaches preceded by warning signs?  No  - Auras?  No  no classic migraine auras.  Do you stop what you are doing during the headache?  Yes.    Do you have multiple types of headaches:Yes        Relevant personal history, habits and occupation:   Sleep pattern/sleep disturbances, significant stressors?: No   Medication overuse?  No  Food: EtOH consumption, excess caffeine consumption (including cola and chocolate), nitrite-containing foods (such as hot dogs and pepperoni), tyramine (in aged cheeses and soy sauce), MSG: No           Headache Risk Factors:  Headache risk factors and/or co-morbidities  No Neck Pain  Yes Back Pain  No History of Motor Vehicle Accident  No Sleep Disorder  No Fibromyalgia  N/A History of Traumatic Brain Injury and/or Concussion  N/A History of Syncope    - Previous Headache Diagnosis (if any): Migraine by PCP  - Investigations done so far and treatment(s) that have been tried: No imaging studies.    Preventive Treatment  Name Dose (if remembered) Results   Antidepressants Nortriptyline 50mg once daily (tricyclic)   Dry mouth, dry eyes, does not work   Antiseizure  Topiramate 100mg daily or 50mg BID   adverse effects:Cognitive slowing  adverse effects:Paresthesias   Beta blockers  Propranolol 60mg BID  Propranolol HCl ER 60mg   Contraindicated, pt has  asthma.  All beta-blockers are contraindicated   CCBs      CGRP pathway targeted treatments      Onabotulinumtoxin A      NMDA antagonist         Other          Acute Treatment Name Dose Results   Analgesics/NSAIDs      Ergot derivatives      Triptans Sumatriptan  100mg Drowsiness; ineffective.  Does not provide relief   Other          Red flags for potential life-threatening headache  - Does it occur when you strain or lift?  No  - Systemic symptoms/signs/disease including fever, chills, rash, myalgia, night sweats, weight loss, comorbid systemic disease, immunocompromise state, malignancy, pregnancy, or postpartum No  - Neurological symptoms/signs including changes in mental status or level of consciousness, diplopia, abnormal cranial nerve function, pulsatile tinnitus, loss of sensation, weakness, ataxia, history of seizure/collapse/loss of consciousness: No  - Thunderclap HA: No   - Older than 50; pt is currently 33 year old.  - Change in pattern.  Progressive headaches (to daily, or continuous pattern): No   - Worse w/ Valsalva: No  - Postural aggravation: No     Distinguishing headache features in cases of nontraumatic subarachnoid hemorrhage include an occipital location, a stabbing quality, rapid peak of intensity (within 1 second of onset) and associated meningismus.      We discussed the 4 phases of a migraine headache including the prodrome/premonitory phase, aura, the headache, and the post drome. I explained the pathophysiology of migraine including cortical spreading depression in layman's terms.  I advised the patient to monitor for any premonitory symptoms or auras.  I discussed that migraine treatment would require a multipronged approach including lifestyle changes as well as medications.    Review and summation of prior records      ROS:  Pertinent positive and negatives per HPI.  All others were reviewed and negative.    Past Medical History:    Allergic rhinitis    Anxiety    Asthma (HCC)     Esophageal reflux    Headache    Constant headaches and migrains on a daily basis    Visual impairment         Current Outpatient Medications:     Propranolol HCl ER 60 MG Oral Capsule SR 24 Hr, Take 1 capsule (60 mg total) by mouth daily., Disp: 90 capsule, Rfl: 3    cyclobenzaprine 10 MG Oral Tab, Take 1 tablet (10 mg total) by mouth 3 (three) times daily as needed for Muscle spasms., Disp: 90 tablet, Rfl: 3    nortriptyline 25 MG Oral Cap, Take 1 capsule (25 mg total) by mouth nightly. Along with 1 capsule (10mg) to equal daily dose of 35mg., Disp: 90 capsule, Rfl: 3    ergocalciferol 1.25 MG (00017 UT) Oral Cap, Take 1 capsule (50,000 Units total) by mouth once a week., Disp: 24 capsule, Rfl: 0    nortriptyline 10 MG Oral Cap, Take 1 capsule (10 mg total) by mouth nightly., Disp: 90 capsule, Rfl: 3    methocarbamol 750 MG Oral Tab, Take 1 tablet (750 mg total) by mouth 4 (four) times daily as needed (pain)., Disp: 60 tablet, Rfl: 3    SUMAtriptan Succinate 50 MG Oral Tab, Take 1 tablet (50 mg total) by mouth every 2 (two) hours as needed for Migraine., Disp: 30 tablet, Rfl: 0    ALPRAZolam (XANAX) 0.25 MG Oral Tab, Take 1 tablet (0.25 mg total) by mouth nightly as needed for Anxiety. To take once per day as needed for anxiety. Can cause sedation/ fatigue., Disp: 30 tablet, Rfl: 0    albuterol (PROAIR HFA) 108 (90 Base) MCG/ACT Inhalation Aero Soln, Inhale 2 puffs into the lungs every 4 (four) hours as needed for Wheezing or Shortness of Breath., Disp: 8.5 each, Rfl: 2    Topiramate 25 MG Oral Capsule Sprinkle, Take 2 capsules (50 mg total) by mouth daily., Disp: 180 capsule, Rfl: 1    ergocalciferol 1.25 MG (52970 UT) Oral Cap, Take 1 capsule (50,000 Units total) by mouth once a week., Disp: 12 capsule, Rfl: 0    Zinc 30 MG Oral Tab, Take 2 tablets by mouth daily. (Patient not taking: Reported on 1/18/2024), Disp: , Rfl:     MAGNESIUM GLYCINATE OR, Take 1 tablet by mouth daily., Disp: , Rfl:     Cholecalciferol  (VITAMIN D3) 250 MCG (14834 UT) Oral Cap, Take 1 capsule by mouth daily., Disp: , Rfl:     No Known Allergies    No past surgical history on file.    Family History   Problem Relation Age of Onset    Cancer Mother 67        Mesothelioma    Heart Disorder Father         Due to smoking    Hypertension Father         Due to smoking       Social history:  History   Smoking Status    Never   Smokeless Tobacco    Never     History   Alcohol Use Not Currently     Comment: 2-3 drinks a month     History   Drug Use Unknown     Comment: once every 3 months       Family History   Problem Relation Age of Onset    Cancer Mother 67        Mesothelioma    Heart Disorder Father         Due to smoking    Hypertension Father         Due to smoking       Objective:  Vitals  Ht 64\"   Wt 200 lb (90.7 kg)   LMP 04/25/2024 (Exact Date)   BMI 34.33 kg/m²   Body mass index is 34.33 kg/m².    Exam:  - General: appears stated age and no distress  - CV: S1, S2 normal    Carotids: no bruits  - Pulmonary: no signs of respiratory distress.    Neurologic Exam  - Mental Status: Alert and attentive.  Oriented x 4..  Speech is spontaneous, fluent, and prosodic. Comprehension intact. Funds of knowledge are good. Phrase length and rate are normal. No paraphasic errors, neologisms, anomia, acalculia, apraxia, neglect, or R/L confusion.   - Cranial Nerves: No gaze preference. Visual fields:normal  Pupils are  4 mm briskly constricting to 3 mm and equally round and reactive to light  in a well lit room. EOMI. No nystagmus. No ptosis. V1-V3 intact B/L to light touch.No pathological facial asymmetry. No flattening of the nasolabial fold. .  Hearing grossly intact.  Tongue midline. No atrophy or fasiculations of the tongue noted. Palate and uvula elevate symmetrically.  Shoulder shrug symmetric.  - Fundoscopic exam:normal w/o hemorrhages, exudates, or papilledema.No attenuation. No pallor.  - Motor:  normal tone, normal bulk. No interosseous wasting. No  flattening of hypothenar eminences.       Right Left     Motor Strength   Deltoids 5 5  Triceps 5 5  Biceps 5 5  Wrist Extensors   5 5   Hip Flexors 5 5   Knee extensors 5 5  Knee flexors 5 5  Plantar flexion 5 5  Dorsiflexion 5 5      Pronator drift: No pronator drift   Arm Rolling: No orbiting.   Finger Taps: Finger taps are symmetric in rate and amplitude.    Rapid movements: Rapid/fine movements are symmetric. As expected their dominant hand is slightly faster.   Foot Taps: Foot taps are symmetric.      Asterixis: No asterixis noted.   Tremor:      Reflexes:    C5 C6 C7  L4 S1   R 2+ 2+  2+ 2+   L 2+ 2+  2+ 2+   Adductor Spread: No adductor spread noted.    Frontal release signs:Not assessed.    Jaw Jerk:    Ron's sign:absent   Nonsustained clonus: Absent   Sustained clonus: Absent   - Sensory:   Light touch: normal  Temperature: normal  Pinprick:   Vibration: normal  Proprioception:      Sensory level:      Romberg:   - Cerebellum: No truncal ataxia. No titubations. No dysmetria, no dysdiadochokinesis. No overshoot.   - Gait/station: Normal gait and station. Symmetric arm swing.   - Toe walking: Normal normal  - Heel walking: Normal  - Plantar response: flexor bilaterally    Data reviewed    Test results/Imaging:   Lab Results   Component Value Date    TSH 3.753 02/26/2024     Lab Results   Component Value Date    HDL 52 02/26/2024     (H) 02/26/2024    TRIG 293 (H) 02/26/2024     Lab Results   Component Value Date    HGB 13.3 04/26/2024    HCT 39.6 04/26/2024    MCV 80.5 04/26/2024    WBC 14.4 (H) 04/26/2024    .0 04/26/2024      Lab Results   Component Value Date    BUN 9 04/26/2024    CA 10.2 04/26/2024    ALT 20 04/26/2024    AST 13 04/26/2024    ALB 4.6 04/26/2024     04/26/2024    K 3.8 04/26/2024     04/26/2024    CO2 28.0 04/26/2024      I have reviewed labs.  Independently reviewed CT of her abdomen to evaluate her lumbar spine  Agree with radiology read.        Leena  Melanie Norman is a 33 year old female w/ a pmhx  of chronic migraine without aura and right-sided lumbar radiculopathy with pain rating down the anterior surface of her leg who presents for management of their headaches.  She has chronic migraine.  She has 30 migraine headache days a month.  She has been on multiple preventative agents without benefit.  She has failed propranolol, Topamax, and nortriptyline.  She is using sumatriptan for rescue but it is not adequate providing relief even after she takes a second dose.  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  Because she would like to start a family soon recommend that she start Botox injection for chronic migraine.  Will switch her sumatriptan to Nurtec.  She has failed sumatriptan.  Will give her a short course of prednisone for her chronic daily migraine.  Toradol injection today for acute relief.  For her right-sided lumbar radicular symptoms will refer to physical therapy.    Chronic migraine without aura  Differential Diagnosis:  Low suspicion for secondary headache no red flags as above  Diagnostics:  Imaging:None indicated.    Sleep study: not indicated  Maintain a daily headache diary  Therapeutics:   Preventative: TCA: Amitriptyline, Nortriptyline  and Botox for CM   An adequate trial of a preventative medication is 2 to 3 months at the target dose.  Rescue/Acute Therapy: Rimegepant     Limit <2 times per week to avoid developing medication overuse headaches.  3. Lifestyle modifications:  Recommend  a stable daily schedule as changes in a pt's daily schedule trigger HAs.  This includes bedtime and wake up time, eating meals regularly, exercising, and maintaining a consistently low stress lifestyle.  Exercise: moderate intensity aerobic exercise (150 minutes/week in 3-5 sessions).  Practice good sleep hygiene. Maintain regular sleep  cycles with bedtime and wake up times that do not differ significantly between weekdays and weekends.  Maintain hydration: Visit [email or web address] to determine the individual water needs based on gender, weight, weather, and level of physical activity.  Avoid factors that increase the risk of developing more frequent headaches including caffeine, obesity, certain sleep disorders, and medication overuse.  To determine if caffeine avoidance will decrease HA burden pts must abstain for at least 2 to 3 months.  If obese or overweight consider weight loss which will decrease the number of headache days.  Simple analgesics are overused if taken on 15 or more days per month regardless of the reason.  All other medications are overused when taken on 10 or more days per month.  Avoid triggers.  Patient will let my office know if she becomes pregnant or plan to become pregnant so we can adjust/stop medication safely  She is aware that she needs to stop the Nurtec months before she tries to conceive.  When she is pregnant the only rescue Medication she can take is Tylenol.       Education/Instructions given to: patient  Instructed patient to call my office or seek medical attention immediately if symptoms worsen.  All questions were answered to the best of my abilities. All side effects of drugs were discussed.    Barriers to Learning:None  Content: Refer to note above. Evaluation/Outcome: Verbalized understanding    This document is not intended to support charting by exception.  Sections left blank in a completed note should be presumed not to have been done.      Today, I personally spent 60 minutes reviewing the prior notes, reviewing any relevant and available imaging, and in direct face to face time with the patient, of which greater than 50% of the time was spent in patient education, counseling, and coordination of care as described above.   Issues discussed: Diagnosis and implications on future health, benefits  and side effects of present and future medications, test results as well as further testing and medications required.    Return to clinic in: Return in about 2 months (around 8/19/2024).    Van Marie DO  Staff V[facility] Neurology   06/19/24  2:24 PM

## 2024-06-19 NOTE — PATIENT INSTRUCTIONS
To prevent headaches please take the following medication(s) every 3 months: Botox    When you feel the headache coming on, take: nurtec   Do a search for the nurtec coupon code    Lifestyle modifications for migraine:  Recommend  a stable daily schedule as changes in your  daily schedule trigger headaches.    This includes maintaining the same bedtime and wake up time (including weekends), eating meals regularly, exercising, and maintaining a consistently low stress lifestyle.  Exercise: moderate intensity aerobic exercise (150 minutes/week in 3-5 sessions).  Practice good sleep hygiene. Maintain regular sleep cycles with bedtime and wake up times that do not differ significantly between weekdays and weekends.  Maintain hydration: Visit [employer] to determine the individual water needs based on gender, weight, weather, and level of physical activity.  Avoid factors that increase the risk of developing more frequent headaches including caffeine use, obesity, certain sleep disorders, and medication overuse.  To determine if caffeine avoidance will decrease your headaches you must abstain for at least 2 to 3 months.  If obese or overweight consider weight loss which will decrease the number of headache days.  Simple analgesics (Advil, Tylenol, Motrin, Ibuprofen, etc) are overused if taken on 15 or more days per month regardless of the reason.  All other medications are overused when taken on 10 or more days per month.  Avoid triggers.    To Do” checklist for improving your headache control    1. Initiate (and stick to) an aerobic conditioning program.    2. Keep a careful headache diary.    3. Maintain good sleep hygiene.    4. Eat nutritional, regularly spaced meals.    5. Treat acute attacks of migraine early and aggressively.    6. Don’t overuse symptomatic medication.    7. If you require prevention/suppression therapy, give the specific therapy a decent chance.    8. If you suffer from a migraine  “co-morbidity,” such as chronic insomnia, anxiety, depression, or other medical problems, seek treatment for that condition.    9. Regularly set aside time for personal relaxation and stress reduction.    10. Give back . . . to your family, friends, community. No matter how badly your migraine makes you feel, you will deal with your headache disorder more effectively if you are actively engaged in the world around you.    HEADACHE / MIGRAINE LIFESTYLE INFORMATION     Headache Preventive Treatment:   Please keep in mind that it takes 4-6 weeks for the medication to start working well and 2-3 months at the appropriate dose before deciding if it will be useful or not. If it is not helping at all by this time, then we will discuss other medications to try. Supplements may take 3-6 months until you see full effect.     Natural supplements:  Magnesium Oxide 400 mg at bedtime   Coenzyme Q10 300 mg in AM  Vitamin B2- 400mg in the morning   Feverfew 50 mg twice a day    Vitamins and herbs that show potential    Magnesium: Magnesium (400 mg at bedtime) has a relaxant effect on smooth muscles such as blood vessels. Individuals suffering from frequent or daily headache usually have low magnesium levels which can be increase with daily supplementation of 400-750 mg. Three trials found 40-90% average headache reduction  when used as a preventative. Magnesium also demonstrated the benefit in menstrually related migraine.  Magnesium is part of the messenger system in the serotonin cascade and it is a good muscle relaxant.  It is also useful for constipation which can be a side effect of other medications used to treat migraine. Good sources include nuts, whole grains, and tomatoes.    Riboflavin (vitamin B 2) 400 mg in the morning. This vitamin assists nerve cells in the production of ATP a principal energy storing molecule.  It is necessary for many chemical reactions in the body.  There have been at least 3 clinical trials of  riboflavin using 400 mg per day all of which suggested that migraine frequency can be decreased.  All 3 trials showed significant improvement in over half of migraine sufferers.  The supplement is found in bread, cereal, milk, meat, and poultry.  Most Americans get more riboflavin than the recommended daily allowance, however riboflavin deficiency is not necessary for the supplements to help prevent headache.    Feverfew: Feverfew is a common garden herb native to [place] and popular [facility]  as a treatment for disorders typically controlled by aspirin.  The mechanism of action is unknown but is believed to be related to a chemical called parthenolide which helps the body use serotonin more effectively.  Serotonin helps prevent migraine and assists with resolution when it occurs.  Parthenolide also inhibits the release of histamine which is linked to pain and inflammation.    Consistency of active ingredients in different products can be a problem.  Some formulations don't have the active ingredient (parthenolide) that prevents migraine.  A parthenolide content of 0.2% is generally recommended.  Typical dosage is one capsule 3 times a day.    Coenzyme Q10: This is present in almost all cells in the body and is critical component for the conversion of energy.  Recent studies have shown that a nutritional supplement of CoQ10 can reduce the frequency of migraine attacks by improving the energy production of cells as with riboflavin.  Doses of 150 mg twice a day have been shown to be effective.    Melatonin: Increasing evidence shows correlation between melatonin secretion and headache conditions.  Melatonin supplementation has decreased headache intensity and duration.  It is widely used as a sleep aid.  Sleep is natures way of dealing with migraine.  A dose of 3 mg is recommended to start for headaches including cluster headache. Higher doses up to 15 mg has been reviewed for use in Cluster headache and have  been used.  The rationale behind using melatonin for cluster is that many theories regarding the cause of Cluster headache center around the disruption of the normal circadian rhythm in the brain.  This helps restore the normal circadian rhythm.    Fabiola: Fabiola has a small amount of antihistamine and anti-inflammatory action which may help headache.  It is primarily used for nausea and may aid in the absorption of other medications.    HEADACHE DIET: Foods and beverages which may trigger migraine  Note that only 20% of headache patients are food sensitive. You will know if you are food sensitive if you get a headache consistently 20 minutes to 2 hours after eating a certain food. Only cut out a food if it causes headaches, otherwise you might remove foods you enjoy! What matters most for diet is to eat a well balanced healthy diet full of vegetables and low fat protein, and to not miss meals.    Chocolate, other sweets    ALL cheeses except cottage and cream cheese    Dairy products, yogurt, sour cream, ice cream    Liver    Meat extracts (Bovril, Marmite, meat tenderizers)    Meats or fish which have undergone aging, fermenting, pickling or smoking. These include: Hotdogs,salami,Lox,sausage,  mortadellas,smoked salmon, pepperoni, Pickled herring    Pods of broad bean (English beans, Chinese pea pods, Italian (ez) beans, lima and navy beans    Ripe avocado, ripe banana    Yeast extracts or active yeast preparations such as Kong's or Radha's (commercial bakes goods are permitted)    Tomato based foods, pizza (lasagna, etc.)    MSG (monosodium glutamate) is disguised as many things; look for these common aliases:  Monopotassium glutamate  Autolysed yeast  Hydrolysed protein  Sodium caseinate  “flavorings”  “all natural preservatives\"    Nutrasweet    Avoid all other foods that convincingly provoke headaches.    Headache Prevention Strategies:    1. Maintain a headache diary; learn to identify and avoid  triggers. Common triggers include:    Emotional triggers:  Emotional/Upset family or friends  Emotional/Upset occupation  Business reversal/success  Anticipation anxiety  Crisis-serious  Post-crisis periodNew job/position     Physical triggers:  Vacation Day  Weekend  Strenuous Exercise  High Altitude Location  New Move  Menstrual Day  Physical Illness  Oversleep/Not enough sleep  Weather changes  Light: Photophobia or light sesnitivity treatment involves a balance between desensitization and reduction in overly strong input. Use dark polarized glasses outside, but not inside. Avoid bright or fluorescent light, but do not dim environment to the point that going into a normally lit room hurts. Consider FL-41 tint lenses, which reduce the most irritating wavelengths without blocking too much light.  These can be obtained at [employer] or [employer]  Foods: see list above.    2. Limit use of acute treatments (over-the-counter medications, triptans, etc.) to no more than 2 days per week or 10 days per month to prevent medication overuse headache (rebound headache).      3. Follow a regular schedule (including weekends and holidays):  Don't skip meals. Eat a balanced diet.  8 hours of sleep nightly.  Minimize stress.  Exercise 30 minutes per day. Being overweight is associated with a 5 times increased risk of chronic migraine.  Keep well hydrated and drink at least 6-8 glasses of water per day.    4. Initiate non-pharmacologic measures at the earliest onset of your headache.  Rest and quiet environment.  Relax and reduce stress. Xuftfmh3Wkopw is a free hiram that can instruct you on    some simple relaxtion and breathing techniques. Http://[employer] is a    free website that provides teaching videos on relaxation.  Also, there are  many apps that   can be downloaded for “mindful” relaxation.  An hiram called YOGA NIDRA will help walk you through mindfulness.  Cold compresses.    5. Don't wait!! Take the maximum  allowable dosage of prescribed medication at the first sign of migraine.    6. Compliance:  Take prescribed medication regularly as directed and at the first sign of a migraine.    7. Communicate:  Call your physician when problems arise, especially if your headaches change, increase in frequency/severity, or become associated with neurological symptoms (weakness, numbness, slurred speech, etc.).    8. Headache/pain management therapies: Consider various complementary methods, including medication, behavioral therapy, psychological counselling, biofeedback, massage therapy, acupuncture, dry needling, and other modalities.  Such measures may reduce the need for medications. Counseling for pain management, where patients learn to function and ignore/minimize their pain, seems to work very well.    9. Recommend changing family's attention and focus away from patient's headaches. Instead, emphasize daily activities. If first question of day is 'How are your headaches/Do you have a headache today?', then patient will constantly think about headaches, thus making them worse. Goal is to re-direct attention away from headaches, toward daily activities and other distractions.    10. Helpful Websites:  www.AmericanHeadacheSociety.org  www.migrainetrust.org  www.headaches.org  www.migraine.org.uk  www.achenet.org    11. HEADACHE EXPECTATIONS:  There are many types of headaches, and only a rare few in which complete relief can be expected.  In general, there is no cure for headache, especially migraine based headaches.  There is nothing available that completely prevents headaches from occurring, breaking through, or having periodic flare-ups and fluctuations.  Regardless of what you are using on a daily basis for prevention, episodic headaches should still be expected, and periods where frequency may escalate and fluctuate are unavoidable.     There is no quick fix for most headaches.  Furthermore, the longer you have had high  frequency headaches (such as chronic daily headache), the longer it will likely take to expect any improvement.  In fact, some people will never improve, regardless of how many medications or other treatments we try.  Our treatment strategy is to evaluate for possible causes of your headache, although testing is usually always normal, even in cases of daily continuous headaches for years.  Most types of headache such as migraine are electrical brain disorders (similar to how epilepsy is an electrical brain disorders).  Therefore, there is no testing that will reveal this \"dysfunctional electrical circuitry\" such on MRI, or other testing.  We try to find a medication that may help lessen the frequency and/or severity of your headaches.  The goal is not to completely stop them from happening, although if that happens, great!  Different people respond to different medications, and some people just don't respond to anything, so it's usually a matter of trying different options.  We can not predict if or when exactly you will respond to a treatment that we provide.     Preventive headache medications take 4-6 weeks to start working, and 2-3 months to see full effect, assuming you reach an effective dose.  Therefore, calling or messaging frequently because you have a headache flare prior to the 3 month fela is unlikely to change anything, and unfortunately there is nothing available that will expedite this, so please try to avoid this.  Our recommendation will generally be to give it adequate time first.  If you are unable to wait it out for medications to work, we can also try IV infusions for some temporary relief.  Or, we offer our 3 week outpatient chronic daily headache program (IMATCH) to help you better learn how to function and deal with chronic headache issues.     In general, the best that preventive medications or other treatments (including Botox) are able to offer in migraine management (variable in other  headache types) is a 50% improvement in frequency and/or severity of headache.  That is our goal, and any additional benefit is considered a bonus.  Some people do significantly better than this, others do not get close to this.  Therefore, if your headaches are not improving by at least 3 months on your preventive strategy, contact us and we can discuss further adjustments.  Keep in mind that complete headache cure is not a realistic expectation.    Our Team:  The nursing staff, and medical assistants are a major part of YOUR TREATMENT TEAM and will be handling your phone calls and inquiries, if any. Unless explicitly told otherwise at the time of your office visit, your study results and ensuing treatment plans will be released via [facility] and discussed during your follow-up appointment.     MyChart: Please ask the schedulers to give you an activation code. The main way of communication is by [employer] rather than phone lines, so if you have not signed up, please do so. [facility] is also the way that you can review your labs and testing.  We are not able to contact everyone to tell them results are normal.  If you do not hear back from us regarding testing you have had, it should be considered normal or within normal range.  If you have any questions about the results, you are free to message us.     [facility] is meant for simple questions regarding medications, possible side effects, or other simple straight forward questions in limited sentences, rather than multiple paragraphs of discussion.  [facility] is not meant for, or efficient for these complex questions, extensive questions, extensive medication adjustments, complex new symptoms or concerns.  These issues beyond simple questions require a follow up visit with myself,      Refills:  Please pay attention to when your refills will need to be renewed. Due to the volume of phone calls daily, this could potentially take a few days, although we certainly try to honor  your refill requests as soon as we can.  You should call at least 1 week in advance of needing a refill to ensure you do not run out of medication.  Keep in mind that refill requests on Fridays may not be filled until the following week.

## 2024-06-19 NOTE — TELEPHONE ENCOUNTER
Initiated authorization for Botox CPT/\[facility]\"" , [address] dx:G43.E09 with [employer]  Case #[id number].  [employer] requested clinicals-clinicals will be faxed to [phone] once note is completed  Status: pending

## 2024-06-26 ENCOUNTER — MED REC SCAN ONLY (OUTPATIENT)
Dept: NEUROLOGY | Facility: CLINIC | Age: 34
End: 2024-06-26

## 2024-06-26 NOTE — TELEPHONE ENCOUNTER
Contacted Baljit at [facility] who states Botox is Approved w/ authorization #[id number] valid 6/19/24-8/3/24 for 45 day initial approval    Insurance: [facility] Member ID# [id number]  Medication: Botox 155 units  Number of visits Approved: 1 - this will be 1 of 1  Dx: G43.E09   Last Botox done: n/a  Next Botox due around: new start  Authorization #[id number]  Valid 6/19/24-8/3/24  BUY[employer]BILL

## 2024-07-11 ENCOUNTER — MED REC SCAN ONLY (OUTPATIENT)
Dept: NEUROLOGY | Facility: CLINIC | Age: 34
End: 2024-07-11

## 2024-07-31 ENCOUNTER — OFFICE VISIT (OUTPATIENT)
Dept: NEUROLOGY | Facility: CLINIC | Age: 34
End: 2024-07-31
Payer: COMMERCIAL

## 2024-07-31 ENCOUNTER — MED REC SCAN ONLY (OUTPATIENT)
Dept: NEUROLOGY | Facility: CLINIC | Age: 34
End: 2024-07-31

## 2024-07-31 DIAGNOSIS — G43.E09 CHRONIC MIGRAINE WITH AURA WITHOUT STATUS MIGRAINOSUS, NOT INTRACTABLE: Primary | ICD-10-CM

## 2024-07-31 PROCEDURE — 64615 CHEMODENERV MUSC MIGRAINE: CPT | Performed by: OTHER

## 2024-07-31 NOTE — PROCEDURES
PROCEDURE: Botox Injections (MIGRAINE PROTOCOL)   PRE-OPERATIVE DIAGNOSIS: Chronic Migraine  POST-OPERATIVE DIAGNOSIS: same as above   BLOOD LOSS: minimal COMPLICATIONS: none     DESCRIPTION OF PROCEDURE: After a discussion of the risks and benefits, the patient consented to the procedure. The patient was taken to the procedure room. The upper back, neck, and occipital area was prepped with alcohol swabs. The 2 Botox vials containing 100 units each, were reconstituted with saline.     There are 31 distinct targets in the standard protocol.    Following muscles and units were injected:     10 units divided between 2 sites.    Procerus 5 units in 1 site.    Frontalis 20 units divided between 4 sites.    Temporalis 40 units divided between 8 sites.    Occipitalis 30 units divided between 6 sites.    Cervical paraspinal 20 units divided between 4 sites.    Trapezius 30 units divided between 6 sites.    Patient tolerated the procedure well.    Total of 155 Units of botulinum toxin were used and 45 Units were wasted.

## 2024-09-25 ENCOUNTER — OFFICE VISIT (OUTPATIENT)
Dept: OBGYN CLINIC | Facility: CLINIC | Age: 34
End: 2024-09-25

## 2024-09-25 VITALS
WEIGHT: 205 LBS | SYSTOLIC BLOOD PRESSURE: 110 MMHG | BODY MASS INDEX: 35 KG/M2 | DIASTOLIC BLOOD PRESSURE: 79 MMHG | HEART RATE: 94 BPM

## 2024-09-25 DIAGNOSIS — G43.001 MIGRAINE WITHOUT AURA AND WITH STATUS MIGRAINOSUS, NOT INTRACTABLE: ICD-10-CM

## 2024-09-25 DIAGNOSIS — Z31.69 ENCOUNTER FOR PRECONCEPTION CONSULTATION: Primary | ICD-10-CM

## 2024-09-25 PROCEDURE — 3078F DIAST BP <80 MM HG: CPT | Performed by: OBSTETRICS & GYNECOLOGY

## 2024-09-25 PROCEDURE — 3074F SYST BP LT 130 MM HG: CPT | Performed by: OBSTETRICS & GYNECOLOGY

## 2024-09-25 PROCEDURE — 99213 OFFICE O/P EST LOW 20 MIN: CPT | Performed by: OBSTETRICS & GYNECOLOGY

## 2024-09-26 PROBLEM — G43.001 MIGRAINE WITHOUT AURA AND WITH STATUS MIGRAINOSUS, NOT INTRACTABLE: Status: ACTIVE | Noted: 2024-09-26

## 2024-09-26 PROBLEM — Z31.69 ENCOUNTER FOR PRECONCEPTION CONSULTATION: Status: ACTIVE | Noted: 2024-09-26

## 2024-09-27 NOTE — PROGRESS NOTES
Subjective:   Patient ID: Leena Norman is a 34 year old female.    HPI  Nulligravida with menses q 24-25d / 6d / normal. She stopped condom use in July. Eight year relationship with Davion and  6 years. He is a healthy 37 y/o non-smoker without any known scrotal surgical history. Her ROS is significant for migraines. She sees Neuro and uses Nortryptiline  and Propanolol. She also did a single set of Botox injections about 3 months ago. This markedly helped frequency down to 4-5 / month.She is here to discuss meds in advance of pregnancy.     History/Other:   Review of Systems   Constitutional:  Negative for appetite change, fatigue and unexpected weight change.   Eyes:  Negative for visual disturbance.   Respiratory:  Negative for cough and shortness of breath.    Cardiovascular:  Negative for chest pain, palpitations and leg swelling.   Gastrointestinal:  Negative for abdominal distention, abdominal pain, blood in stool, constipation and diarrhea.   Genitourinary:  Negative for dyspareunia, dysuria, frequency, menstrual problem, pelvic pain and urgency.   Musculoskeletal:  Negative for arthralgias and myalgias.   Skin:  Negative for rash.   Neurological:  Negative for weakness, numbness and headaches.   Psychiatric/Behavioral:  Negative for dysphoric mood. The patient is not nervous/anxious.      Current Outpatient Medications   Medication Sig Dispense Refill    Rimegepant Sulfate (NURTEC) 75 MG Oral Tablet Dispersible Take 75 mg by mouth as needed. Take one tablet at onset of migraine.  Maximum dose in 24 hours is 1 tablet (75mg). 8 tablet 11    Propranolol HCl ER 60 MG Oral Capsule SR 24 Hr Take 1 capsule (60 mg total) by mouth daily. 90 capsule 3    ergocalciferol 1.25 MG (31338 UT) Oral Cap Take 1 capsule (50,000 Units total) by mouth once a week. 24 capsule 0    nortriptyline 10 MG Oral Cap Take 1 capsule (10 mg total) by mouth nightly. 90 capsule 3    methocarbamol 750 MG Oral Tab Take 1 tablet  (750 mg total) by mouth 4 (four) times daily as needed (pain). 60 tablet 3    ALPRAZolam (XANAX) 0.25 MG Oral Tab Take 1 tablet (0.25 mg total) by mouth nightly as needed for Anxiety. To take once per day as needed for anxiety. Can cause sedation/ fatigue. 30 tablet 0    albuterol (PROAIR HFA) 108 (90 Base) MCG/ACT Inhalation Aero Soln Inhale 2 puffs into the lungs every 4 (four) hours as needed for Wheezing or Shortness of Breath. 8.5 each 2    Topiramate 25 MG Oral Capsule Sprinkle Take 2 capsules (50 mg total) by mouth daily. 180 capsule 1    ergocalciferol 1.25 MG (96890 UT) Oral Cap Take 1 capsule (50,000 Units total) by mouth once a week. 12 capsule 0    MAGNESIUM GLYCINATE OR Take 1 tablet by mouth daily.      Cholecalciferol (VITAMIN D3) 250 MCG (10043 UT) Oral Cap Take 1 capsule by mouth daily.       Allergies:No Known Allergies    Objective:   Physical Exam  Constitutional:       General: She is not in acute distress.     Appearance: Normal appearance. She is not ill-appearing.   Neurological:      Mental Status: She is alert.         Assessment & Plan:   1. Encounter for preconception consultation    2. Migraine without aura and with status migrainosus, not intractable        No orders of the defined types were placed in this encounter.    PLAN: Leena wishes to seek weight loss prior to conception and I heartily agree. It would increase chance of conception and decrease chance of pregnancy complications / risk. She has discussed meds with an outside Dr and may consider at least 4 months. She'll return to condom use with a goal of BMI < 35, if not less than 30. Continue daily prenatal vitamin.       Meds This Visit:  Requested Prescriptions      No prescriptions requested or ordered in this encounter       Imaging & Referrals:  None

## 2024-10-01 ENCOUNTER — TELEPHONE (OUTPATIENT)
Dept: PHYSICAL MEDICINE AND REHAB | Facility: CLINIC | Age: 34
End: 2024-10-01

## 2024-10-01 NOTE — TELEPHONE ENCOUNTER
Peggy from [facility] contacted the office and stated the PA has been approved and extended from the initial 45 day approval.    Insurance: [facility] Member ID# [id number]  Medication: Botox 155 units  Number of visits Approved: 5 (PLEASE INFORM THE PATIENT TO CONTACT THE OFFICE IF THE INSURANCE CHANGES WITHIN THE YEAR AS HE/SHE WILL BE RESPONSIBLE TO UPDATE THE OFFICE IF INSURANCE CHANGES SO THAT PROCEDURE IS BILLED CORRECTLY) this will be 2 of 5  Dx: G43.E09   Last Botox done: 7/31/24  Next Botox Due around: 10/31/24  Authorization #[id number]  Valid 6/19/24-8/3/24  BUY[employer]BILL    Please contact the patient to schedule. Thanks

## 2024-10-01 NOTE — TELEPHONE ENCOUNTER
Initiated authorization for Botox 200U. CPT/[facility] , [address] dx:G43.E09 with [employer]    Clinicals uploaded to [employer]  Status: pending  Reference # [id number]

## 2024-12-03 ENCOUNTER — TELEPHONE (OUTPATIENT)
Dept: FAMILY MEDICINE CLINIC | Facility: CLINIC | Age: 34
End: 2024-12-03

## 2024-12-03 DIAGNOSIS — F41.9 ANXIETY: ICD-10-CM

## 2024-12-03 DIAGNOSIS — G43.E01 CHRONIC MIGRAINE WITH AURA AND WITH STATUS MIGRAINOSUS, NOT INTRACTABLE: ICD-10-CM

## 2024-12-05 RX ORDER — NORTRIPTYLINE HYDROCHLORIDE 10 MG/1
10 CAPSULE ORAL NIGHTLY
Qty: 90 CAPSULE | Refills: 3 | Status: ON HOLD | OUTPATIENT
Start: 2024-12-05 | End: 2024-12-08

## 2024-12-05 NOTE — TELEPHONE ENCOUNTER
Please review. Protocol Failed; No Protocol    No future appointments.    [employer] message sent to patient to schedule an office visit with Provider and/or  Routed to Patient  for assistance with appointment.     Requested Prescriptions   Pending Prescriptions Disp Refills    NORTRIPTYLINE 10 MG Oral Cap [Pharmacy Med Name: NORTRIPTYLINE HCL 10 MG CAP] 90 capsule 3     Sig: Take 1 capsule (10 mg total) by mouth nightly.       There is no refill protocol information for this order              Recent Outpatient Visits              2 months ago Encounter for preconception consultation    [facility] Gumaro Reyes DO    Office Visit    4 months ago Chronic migraine with aura without status migrainosus, not intractable    [facility] Oniel Curiel MD    Office Visit    5 months ago Chronic migraine with aura without status migrainosus, not intractable    [facility] Van Marie DO    Office Visit    10 months ago [facility]    [facility] Visit    1 year ago Need for HPV vaccination    [address]    Nurse Only

## 2024-12-05 NOTE — TELEPHONE ENCOUNTER
Dear Nursing staff,     Please clarify if patient is taking and asking for refill for Nortriptyline 10mg . Patient reported not taking :       Medication Quantity Refills Start End   nortriptyline 25 MG Oral Cap (Discontinued) 90 capsule 3 3/5/2024 9/25/2024   Sig:   Take 1 capsule (25 mg total) by mouth nightly. Along with 1 capsule (10mg) to equal daily dose of 35mg.     Patient not taking:   Reported on 9/25/2024     Route:   Oral     Order #:   [id number]         Thank You ,  Matilda Ng

## 2024-12-06 ENCOUNTER — APPOINTMENT (OUTPATIENT)
Dept: CT IMAGING | Facility: HOSPITAL | Age: 34
End: 2024-12-06
Attending: EMERGENCY MEDICINE
Payer: COMMERCIAL

## 2024-12-06 ENCOUNTER — HOSPITAL ENCOUNTER (INPATIENT)
Facility: HOSPITAL | Age: 34
LOS: 2 days | Discharge: HOME | End: 2024-12-08
Attending: EMERGENCY MEDICINE | Admitting: INTERNAL MEDICINE
Payer: COMMERCIAL

## 2024-12-06 ENCOUNTER — TELEPHONE (OUTPATIENT)
Dept: OBGYN CLINIC | Facility: CLINIC | Age: 34
End: 2024-12-06

## 2024-12-06 DIAGNOSIS — F41.9 ANXIETY: ICD-10-CM

## 2024-12-06 DIAGNOSIS — R73.9 HYPERGLYCEMIA: ICD-10-CM

## 2024-12-06 DIAGNOSIS — G43.E01 CHRONIC MIGRAINE WITH AURA AND WITH STATUS MIGRAINOSUS, NOT INTRACTABLE: ICD-10-CM

## 2024-12-06 DIAGNOSIS — N28.1 RENAL CYST: ICD-10-CM

## 2024-12-06 DIAGNOSIS — R31.0 GROSS HEMATURIA: ICD-10-CM

## 2024-12-06 DIAGNOSIS — N30.01 ACUTE CYSTITIS WITH HEMATURIA: Primary | ICD-10-CM

## 2024-12-06 LAB
ALBUMIN SERPL-MCNC: 4.1 G/DL (ref 3.2–4.8)
ALBUMIN/GLOB SERPL: 1.6 {RATIO} (ref 1–2)
ALP LIVER SERPL-CCNC: 85 U/L
ALT SERPL-CCNC: 19 U/L
ANION GAP SERPL CALC-SCNC: 5 MMOL/L (ref 0–18)
AST SERPL-CCNC: 23 U/L (ref ?–34)
B-HCG UR QL: NEGATIVE
BASOPHILS # BLD AUTO: 0.07 X10(3) UL (ref 0–0.2)
BASOPHILS NFR BLD AUTO: 0.5 %
BILIRUB SERPL-MCNC: 0.2 MG/DL (ref 0.3–1.2)
BILIRUB UR QL CFM: NEGATIVE
BUN BLD-MCNC: 8 MG/DL (ref 9–23)
CALCIUM BLD-MCNC: 9.4 MG/DL (ref 8.7–10.4)
CHLORIDE SERPL-SCNC: 106 MMOL/L (ref 98–112)
CO2 SERPL-SCNC: 28 MMOL/L (ref 21–32)
CREAT BLD-MCNC: 0.83 MG/DL
EGFRCR SERPLBLD CKD-EPI 2021: 94 ML/MIN/1.73M2 (ref 60–?)
EOSINOPHIL # BLD AUTO: 0.11 X10(3) UL (ref 0–0.7)
EOSINOPHIL NFR BLD AUTO: 0.8 %
ERYTHROCYTE [DISTWIDTH] IN BLOOD BY AUTOMATED COUNT: 12.9 %
GLOBULIN PLAS-MCNC: 2.6 G/DL (ref 2–3.5)
GLUCOSE BLD-MCNC: 145 MG/DL (ref 70–99)
GLUCOSE UR STRIP.AUTO-MCNC: 100 MG/DL
HCT VFR BLD AUTO: 35.7 %
HGB BLD-MCNC: 12.2 G/DL
IMM GRANULOCYTES # BLD AUTO: 0.06 X10(3) UL (ref 0–1)
IMM GRANULOCYTES NFR BLD: 0.4 %
INR BLD: 0.96 (ref 0.8–1.2)
KETONES UR STRIP.AUTO-MCNC: 40 MG/DL
LYMPHOCYTES # BLD AUTO: 3.27 X10(3) UL (ref 1–4)
LYMPHOCYTES NFR BLD AUTO: 23.7 %
MCH RBC QN AUTO: 27.1 PG (ref 26–34)
MCHC RBC AUTO-ENTMCNC: 34.2 G/DL (ref 31–37)
MCV RBC AUTO: 79.3 FL
MONOCYTES # BLD AUTO: 0.7 X10(3) UL (ref 0.1–1)
MONOCYTES NFR BLD AUTO: 5.1 %
NEUTROPHILS # BLD AUTO: 9.58 X10 (3) UL (ref 1.5–7.7)
NEUTROPHILS # BLD AUTO: 9.58 X10(3) UL (ref 1.5–7.7)
NEUTROPHILS NFR BLD AUTO: 69.5 %
NITRITE UR QL STRIP.AUTO: POSITIVE
OSMOLALITY SERPL CALC.SUM OF ELEC: 289 MOSM/KG (ref 275–295)
PH UR STRIP.AUTO: 8.5 [PH] (ref 5–8)
PLATELET # BLD AUTO: 328 10(3)UL (ref 150–450)
POTASSIUM SERPL-SCNC: 3.7 MMOL/L (ref 3.5–5.1)
PROT SERPL-MCNC: 6.7 G/DL (ref 5.7–8.2)
PROT UR STRIP.AUTO-MCNC: >=300 MG/DL
PROTHROMBIN TIME: 12.9 SECONDS (ref 11.6–14.8)
RBC # BLD AUTO: 4.5 X10(6)UL
RBC #/AREA URNS AUTO: >10 /HPF
RBC #/AREA URNS AUTO: >10 /HPF
SODIUM SERPL-SCNC: 139 MMOL/L (ref 136–145)
SP GR UR STRIP.AUTO: 1.01 (ref 1–1.03)
UROBILINOGEN UR STRIP.AUTO-MCNC: >=8 MG/DL
WBC # BLD AUTO: 13.8 X10(3) UL (ref 4–11)
WBC #/AREA URNS AUTO: >50 /HPF
WBC #/AREA URNS AUTO: >50 /HPF

## 2024-12-06 PROCEDURE — 74177 CT ABD & PELVIS W/CONTRAST: CPT | Performed by: EMERGENCY MEDICINE

## 2024-12-06 PROCEDURE — 99223 1ST HOSP IP/OBS HIGH 75: CPT | Performed by: INTERNAL MEDICINE

## 2024-12-06 RX ORDER — NORTRIPTYLINE HYDROCHLORIDE 25 MG/1
25 CAPSULE ORAL NIGHTLY
Status: DISCONTINUED | OUTPATIENT
Start: 2024-12-07 | End: 2024-12-08

## 2024-12-06 RX ORDER — PROPRANOLOL HCL 20 MG
20 TABLET ORAL 4 TIMES DAILY
Status: DISCONTINUED | OUTPATIENT
Start: 2024-12-07 | End: 2024-12-08

## 2024-12-06 RX ORDER — MORPHINE SULFATE 4 MG/ML
4 INJECTION, SOLUTION INTRAMUSCULAR; INTRAVENOUS EVERY 2 HOUR PRN
Status: DISCONTINUED | OUTPATIENT
Start: 2024-12-06 | End: 2024-12-08

## 2024-12-06 RX ORDER — ALBUTEROL SULFATE 90 UG/1
2 INHALANT RESPIRATORY (INHALATION) EVERY 4 HOURS PRN
Status: DISCONTINUED | OUTPATIENT
Start: 2024-12-06 | End: 2024-12-08

## 2024-12-06 RX ORDER — ACETAMINOPHEN 500 MG
500 TABLET ORAL EVERY 4 HOURS PRN
Status: DISCONTINUED | OUTPATIENT
Start: 2024-12-06 | End: 2024-12-08

## 2024-12-06 RX ORDER — MORPHINE SULFATE 2 MG/ML
1 INJECTION, SOLUTION INTRAMUSCULAR; INTRAVENOUS EVERY 2 HOUR PRN
Status: DISCONTINUED | OUTPATIENT
Start: 2024-12-06 | End: 2024-12-08

## 2024-12-06 RX ORDER — SENNOSIDES 8.6 MG
17.2 TABLET ORAL NIGHTLY PRN
Status: DISCONTINUED | OUTPATIENT
Start: 2024-12-06 | End: 2024-12-08

## 2024-12-06 RX ORDER — PROCHLORPERAZINE EDISYLATE 5 MG/ML
5 INJECTION INTRAMUSCULAR; INTRAVENOUS EVERY 8 HOURS PRN
Status: DISCONTINUED | OUTPATIENT
Start: 2024-12-06 | End: 2024-12-08

## 2024-12-06 RX ORDER — ONDANSETRON 2 MG/ML
4 INJECTION INTRAMUSCULAR; INTRAVENOUS EVERY 6 HOURS PRN
Status: DISCONTINUED | OUTPATIENT
Start: 2024-12-06 | End: 2024-12-08

## 2024-12-06 RX ORDER — KETOROLAC TROMETHAMINE 15 MG/ML
15 INJECTION, SOLUTION INTRAMUSCULAR; INTRAVENOUS ONCE
Status: COMPLETED | OUTPATIENT
Start: 2024-12-06 | End: 2024-12-06

## 2024-12-06 RX ORDER — ALPRAZOLAM 0.25 MG/1
0.25 TABLET ORAL NIGHTLY PRN
Status: DISCONTINUED | OUTPATIENT
Start: 2024-12-06 | End: 2024-12-08

## 2024-12-06 RX ORDER — POLYETHYLENE GLYCOL 3350 17 G/17G
17 POWDER, FOR SOLUTION ORAL DAILY PRN
Status: DISCONTINUED | OUTPATIENT
Start: 2024-12-06 | End: 2024-12-08

## 2024-12-06 RX ORDER — MORPHINE SULFATE 4 MG/ML
4 INJECTION, SOLUTION INTRAMUSCULAR; INTRAVENOUS ONCE
Status: COMPLETED | OUTPATIENT
Start: 2024-12-06 | End: 2024-12-06

## 2024-12-06 RX ORDER — MORPHINE SULFATE 2 MG/ML
2 INJECTION, SOLUTION INTRAMUSCULAR; INTRAVENOUS EVERY 2 HOUR PRN
Status: DISCONTINUED | OUTPATIENT
Start: 2024-12-06 | End: 2024-12-08

## 2024-12-06 RX ORDER — SODIUM CHLORIDE 9 MG/ML
INJECTION, SOLUTION INTRAVENOUS CONTINUOUS
Status: DISCONTINUED | OUTPATIENT
Start: 2024-12-07 | End: 2024-12-08

## 2024-12-06 NOTE — TELEPHONE ENCOUNTER
Patient is calling regarding symptoms, thinks she has UTI, unsure if she needs to make an appointment. Has had blood in her urine, cervix cramping.

## 2024-12-06 NOTE — TELEPHONE ENCOUNTER
Patient states today she went to urinate and there was blood in the toilet and with wiping.  There were also small cots on the toilet paper.  Patient is also experiencing soreness in her cervix, like you would feel after a pap smear.  Patient unsure if the blood is in her urine or from the vagina.  Patient denied burning with urination or frequency.  Patient states she is having lauren urinary urgency but thinks it may be due to her cervix being irritated and that is making her feel like she has to urinate.  Last menstrual period was 11/26/2024 and her periods are regular, every 24-25 days.  Patient is not on any birth control and is trying to conceive.  Patient did have intercourse last night and it was painful, but states this does happen sometimes.  Patient did use lubrication.  Patient states she was told she has tight pelvic muscles and unsure if that would cause her pain.  Patient is in the car right now and will go home to check if the bleeding is vaginal or urethral.  Patient will call back..

## 2024-12-06 NOTE — TELEPHONE ENCOUNTER
Provider refilled nortriptyline 10mg.     If patient calls, have her schedule appointment.  Due for annual January 2025.

## 2024-12-06 NOTE — TELEPHONE ENCOUNTER
Patient states when she got home she placed a tampon but there was no blood on it.  When she urinated there was more blood in the toilet with some small clots.  Patient states when she was urinating there was some pain but it was vaginal.  Patient states it feels like something needs to pass from her vagina, like a blood clot, but there is nothing there.  Patient rates the pain 5/10 but it increases to 7-8/10 when she urinates.  But patient states it does not feel like bladder pain.  Patient denies fever, or chills.  Patient uses a disk for her periods so states it is not a retained tampon and her disk is out.  UA ordered since pain increases with urination.  Appointment booked with Nissa Jordan on 12/13.  Patient advised to call if pain worsens, or if bleeding increases.  Message also sent to Dr. Reyes for any other recommendations.  See message below as well.

## 2024-12-06 NOTE — ED INITIAL ASSESSMENT (HPI)
Pt here with family due to painful, bloody urination that started this morning. Pt stated that this morning she started to have painful, bloody urination. Pt states that she has never felt this before. Pt denies any other symptoms.

## 2024-12-06 NOTE — TELEPHONE ENCOUNTER
Leena calling back with update. She states she can confirm bleeding is definitely coming from urinary tract. She states bright red, fresh blood in toilet bowl with clots of tissue. Pain now up to 9/10.     Advised may be kidney stone, but since pain is now 9/10 and fresh blood, should be seen in ER. Patient verbalized understanding.

## 2024-12-07 LAB
ANION GAP SERPL CALC-SCNC: 5 MMOL/L (ref 0–18)
BUN BLD-MCNC: 6 MG/DL (ref 9–23)
CALCIUM BLD-MCNC: 8.7 MG/DL (ref 8.7–10.4)
CHLORIDE SERPL-SCNC: 109 MMOL/L (ref 98–112)
CO2 SERPL-SCNC: 26 MMOL/L (ref 21–32)
CREAT BLD-MCNC: 0.64 MG/DL
DEPRECATED HBV CORE AB SER IA-ACNC: 34 NG/ML
EGFRCR SERPLBLD CKD-EPI 2021: 118 ML/MIN/1.73M2 (ref 60–?)
ERYTHROCYTE [DISTWIDTH] IN BLOOD BY AUTOMATED COUNT: 13.1 %
EST. AVERAGE GLUCOSE BLD GHB EST-MCNC: 120 MG/DL (ref 68–126)
GLUCOSE BLD-MCNC: 109 MG/DL (ref 70–99)
HBA1C MFR BLD: 5.8 % (ref ?–5.7)
HCT VFR BLD AUTO: 35.6 %
HGB BLD-MCNC: 12.2 G/DL
IRON SATN MFR SERPL: 12 %
IRON SERPL-MCNC: 34 UG/DL
MCH RBC QN AUTO: 27.9 PG (ref 26–34)
MCHC RBC AUTO-ENTMCNC: 34.3 G/DL (ref 31–37)
MCV RBC AUTO: 81.5 FL
OSMOLALITY SERPL CALC.SUM OF ELEC: 288 MOSM/KG (ref 275–295)
PLATELET # BLD AUTO: 306 10(3)UL (ref 150–450)
POTASSIUM SERPL-SCNC: 3.9 MMOL/L (ref 3.5–5.1)
RBC # BLD AUTO: 4.37 X10(6)UL
SODIUM SERPL-SCNC: 140 MMOL/L (ref 136–145)
TOTAL IRON BINDING CAPACITY: 277 UG/DL (ref 250–425)
TRANSFERRIN SERPL-MCNC: 207 MG/DL (ref 250–380)
WBC # BLD AUTO: 11.2 X10(3) UL (ref 4–11)

## 2024-12-07 PROCEDURE — 99232 SBSQ HOSP IP/OBS MODERATE 35: CPT | Performed by: HOSPITALIST

## 2024-12-07 PROCEDURE — 99221 1ST HOSP IP/OBS SF/LOW 40: CPT | Performed by: UROLOGY

## 2024-12-07 NOTE — PLAN OF CARE
Pt A&Ox4, VSS, on room air. CBI continues to run; urine yellow-pink. Pt had a great deal of cramping and pain in the morning and early afternoon. Urologist informed. C/o pain managed with IVP morphine. Pain improved significantly late afternoon/early evening. Catheter irrigated x 1, several clots removed. No plans for an inpatient procedure at this time. Urologist would like CBI to be clamped at 0600 tomorrow morning; order placed in Epic. Pt and spouse updated on and in agreement with POC. Call light within reach, fall and safety precautions in place.     Problem: PAIN - ADULT  Goal: Verbalizes/displays adequate comfort level or patient's stated pain goal  Description: INTERVENTIONS:  - Encourage pt to monitor pain and request assistance  - Assess pain using appropriate pain scale  - Administer analgesics based on type and severity of pain and evaluate response  - Implement non-pharmacological measures as appropriate and evaluate response  - Consider cultural and social influences on pain and pain management  - Manage/alleviate anxiety  - Utilize distraction and/or relaxation techniques  - Monitor for opioid side effects  - Notify MD/LIP if interventions unsuccessful or patient reports new pain  - Anticipate increased pain with activity and pre-medicate as appropriate  Outcome: Progressing     Problem: SAFETY ADULT - FALL  Goal: Free from fall injury  Description: INTERVENTIONS:  - Assess pt frequently for physical needs  - Identify cognitive and physical deficits and behaviors that affect risk of falls.  - [place] fall precautions as indicated by assessment.  - Educate pt/family on patient safety including physical limitations  - Instruct pt to call for assistance with activity based on assessment  - Modify environment to reduce risk of injury  - Provide assistive devices as appropriate  - Consider OT/PT consult to assist with strengthening/mobility  - Encourage toileting schedule  Outcome: Progressing

## 2024-12-07 NOTE — PROGRESS NOTES
[facility]   part of [facility]     Hospitalist Progress Note     Leena Norman Patient Status:  Inpatient    1990 MRN [id number]   Location [facility] 4NW-A Attending Rosalio Sorenson DO   Hosp Day # 1 PCP Christa Ellis MD     Chief Complaint: Hematuria    Subjective:     Patient seen and examined. Reports waxing and waning abdominal pain.     Objective:    Review of Systems:   A comprehensive review of systems was completed; pertinent positive and negatives stated in subjective.    Vital signs:  Temp:  [97.6 °F (36.4 °C)-98.2 °F (36.8 °C)] 97.6 °F (36.4 °C)  Pulse:  [] 75  Resp:  [16-18] 18  BP: (103-135)/(62-83) 103/62  SpO2:  [95 %-100 %] 100 %    Physical Exam:    General: No acute distress  Respiratory: No wheezes, no rhonchi  Cardiovascular: S1, S2, regular rate and rhythm  Abdomen: Soft, suprapubic tenderness, non-distended, positive bowel sounds  Neuro: No new focal deficits.   Extremities: No edema    Diagnostic Data:    Labs:  Recent Labs   Lab 24  1734 24  0459   WBC 13.8* 11.2*   HGB 12.2 12.2   MCV 79.3* 81.5   .0 306.0   INR 0.96  --        Recent Labs   Lab 24  1734 24  0459   * 109*   BUN 8* 6*   CREATSERUM 0.83 0.64   CA 9.4 8.7   ALB 4.1  --     140   K 3.7 3.9    109   CO2 28.0 26.0   ALKPHO 85  --    AST 23  --    ALT 19  --    BILT 0.2*  --    TP 6.7  --        Estimated Creatinine Clearance: 105.9 mL/min (based on SCr of 0.64 mg/dL).    No results for input(s): \"TROP\", \"TROPHS\", \"CK\" in the last 168 hours.    Recent Labs   Lab 24  1734   PTP 12.9   INR 0.96                  Microbiology    Hospital Encounter on 24   1. Urine Culture, Routine     Status: Abnormal (Preliminary result)    Collection Time: 24  4:08 PM    Specimen: Urine, clean catch   Result Value Ref Range    Urine Culture >100,000 CFU/ML Escherichia coli (A) N/A         Imaging: Reviewed in Epic.    Medications:     nortriptyline  25 mg Oral Nightly    propranolol  20 mg Oral QID    cefTRIAXone  1 g Intravenous Q24H       Assessment & Plan:      #Gross hematuria  #Abdominal pain  -Suspect hemorrhagic cystitis  -Imaging without bladder mass  -CBI  -Pending urology eval  -Continue rocephin pending cultures    #Anxiety  -Continue home meds    Rosalio Sorenson DO    Supplementary Documentation:     Quality:  DVT Mechanical Prophylaxis:   SCDs,    DVT Pharmacologic Prophylaxis   Medication   None                Code Status: Not on file  Dumont: Dumont catheter in place  Dumont Duration (in days): 2  Central line:    SYLVIA:     Discharge is dependent on: clinical progress  At this point Ms. Noramn is expected to be discharge to: home    The 21st Century Cures Act makes medical notes like these available to patients in the interest of transparency. Please be advised this is a medical document. Medical documents are intended to carry relevant information, facts as evident, and the clinical opinion of the practitioner. The medical note is intended as peer to peer communication and may appear blunt or direct. It is written in medical language and may contain abbreviations or verbiage that are unfamiliar.

## 2024-12-07 NOTE — PROGRESS NOTES
NURSING ADMISSION NOTE      Patient admitted via Cart  Oriented to room.  Safety precautions initiated.  Bed in low position.  Call light in reach.    Admitted with hematuria. CBI started in ED. Med rec completed at bedside. IV fluids infusing. NPO at midnight. Urology to see pt in the AM. All meds given. Tolerated well. Call light within reach    Patient having a lot of pain in lower abd. CBI irrigated. Small clots. Pain meds given. Patient relieved of pain after irrigation.

## 2024-12-07 NOTE — H&P
[facility]     Leena Norman Patient Status:  Inpatient    1990 MRN [id number]   Location [facility] 4NW-A Attending Jyoti Nickerson MD   Hosp Day # 1 PCP Christa Ellis MD     Chief Complaint: hematuria     Subjective:    History of Present Illness:     Leena Norman is a 34 year old female with no significant past medical history presented to the emergency room with gross hematuria which started today.  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  Per patient she was having severe lower abdominal cramping and pressure.  She had increased frequency and initially thought there was vaginal bleeding however she noted that it was in fact bleeding from her urethra.    Patient is not on any anticoagulation.  No recent fever or chills, nausea or vomiting, diarrhea.  Denies any flank pain.  Patient states that she had intercourse with her  yesterday but no other significant trauma.    History/Other:    Past Medical History:  Past Medical History:    Allergic rhinitis    Anxiety    Asthma (HCC)    Esophageal reflux    Headache    Constant headaches and migrains on a daily basis    Visual impairment     Past Surgical History:   History reviewed. No pertinent surgical history.   Family History:   Family History   Problem Relation Age of Onset    Cancer Mother 67        Mesothelioma    Heart Disorder Father         Due to smoking    Hypertension Father         Due to smoking     Social History:    reports that she has never smoked. She has never used smokeless tobacco. She reports that she does not currently use alcohol after a past usage of about 2.0 standard drinks of alcohol per week. She reports current drug use. Drug: Cannabis.     Allergies: Allergies[1]    Medications:  Medications Ordered Prior to Encounter[2]    Review of Systems:   A comprehensive review of systems was completed.    Pertinent positives and negatives noted in  the HPI.    Objective:   Physical Exam:    /65 (BP Location: Left arm)   Pulse 78   Temp 97.9 °F (36.6 °C) (Oral)   Resp 18   Ht 5' 4\" (1.626 m)   Wt 200 lb (90.7 kg)   LMP 11/26/2024 (Exact Date)   SpO2 98%   BMI 34.33 kg/m²   General: No acute distress, Alert  Respiratory: No rhonchi, no wheezes  Cardiovascular: S1, S2. Regular rate and rhythm  Abdomen: Soft, + tenderness to palpation in lower quadrants and suprapubic area  Neuro: No new focal deficits  Extremities: No edema      Results:    Labs:      Labs Last 24 Hours:    Recent Labs   Lab 12/06/24  1734   RBC 4.50   HGB 12.2   HCT 35.7   MCV 79.3*   MCH 27.1   MCHC 34.2   RDW 12.9   NEPRELIM 9.58*   WBC 13.8*   .0       Recent Labs   Lab 12/06/24  1734   *   BUN 8*   CREATSERUM 0.83   EGFRCR 94   CA 9.4   ALB 4.1      K 3.7      CO2 28.0   ALKPHO 85   AST 23   ALT 19   BILT 0.2*   TP 6.7       Lab Results   Component Value Date    INR 0.96 12/06/2024       No results for input(s): \"TROP\", \"TROPHS\", \"CK\" in the last 168 hours.    No results for input(s): \"TROP\", \"PBNP\" in the last 168 hours.    No results for input(s): \"PCT\" in the last 168 hours.    Imaging: Imaging data reviewed in Epic.    Assessment & Plan:      # Acute cystitis with hematuria  - cont abx   - Ucx  - IVF    # gross hematuria  - pain control   - CBI  - urology consulted  - NPO after midnight     # Anxiety, resume home meds  # Hyperglycemia  - HgA1c      Plan of care discussed with patient , spouse, ER.     Jyoti Nickerson MD    Supplementary Documentation:     The 21st Century Cures Act makes medical notes like these available to patients in the interest of transparency. Please be advised this is a medical document. Medical documents are intended to carry relevant information, facts as evident, and the clinical opinion of the practitioner. The medical note is intended as peer to peer communication and may appear blunt or direct. It is written in medical  language and may contain abbreviations or verbiage that are unfamiliar.                                       [1] No Known Allergies  [2]   No current facility-administered medications on file prior to encounter.     Current Outpatient Medications on File Prior to Encounter   Medication Sig Dispense Refill    nortriptyline 10 MG Oral Cap Take 1 capsule (10 mg total) by mouth nightly. (Patient taking differently: Take 25 mg by mouth nightly.) 90 capsule 3    Propranolol HCl ER 60 MG Oral Capsule SR 24 Hr Take 1 capsule (60 mg total) by mouth daily. 90 capsule 3    methocarbamol 750 MG Oral Tab Take 1 tablet (750 mg total) by mouth 4 (four) times daily as needed (pain). 60 tablet 3    ALPRAZolam (XANAX) 0.25 MG Oral Tab Take 1 tablet (0.25 mg total) by mouth nightly as needed for Anxiety. To take once per day as needed for anxiety. Can cause sedation/ fatigue. 30 tablet 0    albuterol (PROAIR HFA) 108 (90 Base) MCG/ACT Inhalation Aero Soln Inhale 2 puffs into the lungs every 4 (four) hours as needed for Wheezing or Shortness of Breath. 8.5 each 2

## 2024-12-07 NOTE — ED QUICK NOTES
Orders for admission, patient is aware of plan and ready to go upstairs. Any questions, please call ED RN Roselia at extension [phone]    Vaccinated? N/a  Type of COVID test sent:  COVID Suspicion level:       Titratable drug(s) infusing:   Rate:     LOC at time of transport: A&O x4    Other pertinent information: CBI in process    CIWA score=  NIH score=

## 2024-12-07 NOTE — CONSULTS
[facility]   part of [facility]    Report of Consultation    Leena Norman Patient Status:  Inpatient    1990 MRN [id number]   Location [facility] 4NW-A Attending Rosalio Sorenson DO   Hosp Day # 1 PCP Christa Ellis MD     Date of Admission:  2024  Date of Consult:  2024   Reason for Consultation:   Gross hematuria, painful bladder, UTI    History of Present Illness:   Patient is a 34 year old female who was admitted to the hospital for Acute cystitis with hematuria:  34 year old female with no  history admitted through the ER with gross hematuria, clots and retention and painful bladder 24.  No fever or chills or flank pain.  3 way alfaro placed in the ER and she was manually irrigated and started on CBI.  Has been stable on the floor.  Urine clear on moderate rate CBI and it was slowed down this morning.  Feels better.  Nurses have manually irrigated catheter 1 time since yesterday to relieve pressure but no issues with clot obstruction.  Urine culture growing E. Coli.  She was started on Ceftriaxone in the ER..    Past Medical History  Past Medical History:    Allergic rhinitis    Anxiety    Asthma (HCC)    Esophageal reflux    Headache    Constant headaches and migrains on a daily basis    Visual impairment       Past Surgical History  History reviewed. No pertinent surgical history.    Family History  Family History   Problem Relation Age of Onset    Cancer Mother 67        Mesothelioma    Heart Disorder Father         Due to smoking    Hypertension Father         Due to smoking       Social History  Pediatric History   Patient Parents    Not on file     Other Topics Concern     Service Not Asked    Blood Transfusions Not Asked    Caffeine Concern Yes    Occupational Exposure Not Asked    Hobby Hazards Not Asked    Sleep Concern Not Asked    Stress Concern Yes    Weight Concern Yes    Special Diet Not Asked    Back Care Not Asked    Exercise Yes    Bike  Helmet Not Asked    Seat Belt Yes    Self-Exams Yes     Comment: self breast exam 1-2 times a year   Social History Narrative    Not on file           Current Medications:  Current Facility-Administered Medications   Medication Dose Route Frequency    albuterol (Ventolin HFA) 108 (90 Base) MCG/ACT inhaler 2 puff  2 puff Inhalation Q4H PRN    ALPRAZolam (Xanax) tab 0.25 mg  0.25 mg Oral Nightly PRN    nortriptyline (Pamelor) cap 25 mg  25 mg Oral Nightly    propranolol (Inderal) tab 20 mg  20 mg Oral QID    sodium chloride 0.9% infusion   Intravenous Continuous    morphINE PF 2 MG/ML injection 1 mg  1 mg Intravenous Q2H PRN    Or    morphINE PF 2 MG/ML injection 2 mg  2 mg Intravenous Q2H PRN    Or    morphINE PF 4 MG/ML injection 4 mg  4 mg Intravenous Q2H PRN    acetaminophen (Tylenol Extra Strength) tab 500 mg  500 mg Oral Q4H PRN    melatonin tab 3 mg  3 mg Oral Nightly PRN    ondansetron (Zofran) 4 MG/2ML injection 4 mg  4 mg Intravenous Q6H PRN    prochlorperazine (Compazine) 10 MG/2ML injection 5 mg  5 mg Intravenous Q8H PRN    polyethylene glycol (PEG 3350) (Miralax) 17 g oral packet 17 g  17 g Oral Daily PRN    sennosides (Senokot) tab 17.2 mg  17.2 mg Oral Nightly PRN    cefTRIAXone (Rocephin) 1 g in sodium chloride 0.9% 100 mL IVPB-ADDV  1 g Intravenous Q24H     Medications Prior to Admission   Medication Sig    nortriptyline 10 MG Oral Cap Take 1 capsule (10 mg total) by mouth nightly. (Patient taking differently: Take 25 mg by mouth nightly.)    Propranolol HCl ER 60 MG Oral Capsule SR 24 Hr Take 1 capsule (60 mg total) by mouth daily.    methocarbamol 750 MG Oral Tab Take 1 tablet (750 mg total) by mouth 4 (four) times daily as needed (pain).    ALPRAZolam (XANAX) 0.25 MG Oral Tab Take 1 tablet (0.25 mg total) by mouth nightly as needed for Anxiety. To take once per day as needed for anxiety. Can cause sedation/ fatigue.    albuterol (PROAIR HFA) 108 (90 Base) MCG/ACT Inhalation Aero Soln Inhale 2 puffs  into the lungs every 4 (four) hours as needed for Wheezing or Shortness of Breath.       Allergies  Allergies[1]    Review of Systems:    Pertinent items are noted in HPI.    Physical Exam:   Blood pressure 103/62, pulse 75, temperature 98.4 °F (36.9 °C), temperature source Oral, resp. rate 16, height 5' 4\" (1.626 m), weight 200 lb (90.7 kg), last menstrual period 11/26/2024, SpO2 100%.    General appearance:  alert, appears stated age, and cooperative  Abdominal: soft, non-tender; bowel sounds normal; no masses,  no organomegaly  Neurologic: Grossly normal  Psychiatric: calm    Results:     Laboratory Data:  Lab Results   Component Value Date    WBC 11.2 (H) 12/07/2024    HGB 12.2 12/07/2024    HCT 35.6 12/07/2024    .0 12/07/2024    CREATSERUM 0.64 12/07/2024    BUN 6 (L) 12/07/2024     12/07/2024    K 3.9 12/07/2024     12/07/2024    CO2 26.0 12/07/2024     (H) 12/07/2024    CA 8.7 12/07/2024    ALB 4.1 12/06/2024    ALKPHO 85 12/06/2024    TP 6.7 12/06/2024    AST 23 12/06/2024    ALT 19 12/06/2024    INR 0.96 12/06/2024         Imaging:  CT ABDOMEN+PELVIS(CONTRAST ONLY)(CPT=74177)    Result Date: 12/6/2024  CONCLUSION:  1. Mild urinary bladder wall thickening.  Correlate for cystitis.  2. No additional specific abnormality.   LOCATION:  [facility]   Dictated by (CST): Chi Chin MD on 12/06/2024 at 8:12 PM     Finalized by (CST): Chi Chin MD on 12/06/2024 at 8:16 PM           Impression:     Acute cystitis with hematuria  Continue Rocephin pending final urine culture results.    Gross hematuria  Much improved.  Continue slow rate CBI for today.  CT abd and pelvis with contrast showed no acute findings.        Recommendations:  -continue normal saline CBI at slow rate.  Manually irrigate as needed.  -Continue Rocephin pending final urine culture results.  -Reassess in AM.  If stable, can remove alfaro and discharge oral antibiotics based on final urine cultures.    Thank you for allowing  me to participate in the care of your patient.    Shashank Xiao MD  12/7/2024         [1] No Known Allergies

## 2024-12-07 NOTE — ED PROVIDER NOTES
Patient Seen in: [facility] Emergency Department      History     Chief Complaint   Patient presents with    Urinary Symptoms     Stated Complaint: Blood in urine since 1100, pain    Subjective:   34-year-old female, history of asthma, GERD, presents with hematuria, started today.  Yesterday was fine.  No stiff kidney stone but intermittent some lower quadrant cramping over the past couple of days to weeks.  Today started having gross hematuria when she voids, not vaginal bleeding.  No GI bleeding.  Burning as well.  No history of recurrent UTI.  LMP was about 2 weeks ago.  No trauma.  No fever.  No vomiting.              Objective:     Past Medical History:    Allergic rhinitis    Anxiety    Asthma (HCC)    Esophageal reflux    Headache    Constant headaches and migrains on a daily basis    Visual impairment              History reviewed. No pertinent surgical history.             Social History     Socioeconomic History    Marital status:    Tobacco Use    Smoking status: Never    Smokeless tobacco: Never   Vaping Use    Vaping status: Never Used   Substance and Sexual Activity    Alcohol use: Not Currently     Alcohol/week: 2.0 standard drinks of alcohol     Types: 2 Glasses of wine per week     Comment: 2-3 drinks a month    Drug use: Yes     Types: Cannabis     Comment: once every 3 months    Sexual activity: Yes     Partners: Male   Other Topics Concern    Caffeine Concern Yes    Stress Concern Yes    Weight Concern Yes    Exercise Yes    Seat Belt Yes    Self-Exams Yes     Comment: self breast exam 1-2 times a year                  Physical Exam     ED Triage Vitals [12/06/24 1619]   /83   Pulse 101   Resp 18   Temp 98.2 °F (36.8 °C)   Temp src Temporal   SpO2 99 %   O2 Device None (Room air)       Current Vitals:   Vital Signs  BP: 121/70  Pulse: 87  Resp: 18  Temp: 98.2 °F (36.8 °C)  Temp src: Temporal  MAP (mmHg): 85    Oxygen Therapy  SpO2: 99 %  O2 Device: None (Room  air)        Physical Exam  Vitals and nursing note reviewed.   HENT:      Head: Normocephalic.   Cardiovascular:      Rate and Rhythm: Normal rate.      Heart sounds: Normal heart sounds.   Pulmonary:      Effort: Pulmonary effort is normal. No respiratory distress.      Breath sounds: Normal breath sounds.   Abdominal:      Palpations: Abdomen is soft.      Comments: Some suprapubic fullness and tenderness without rebound or guarding.  Bedside ultrasound without any urinary retention.  Decompressed bladder, lots of bowel gas   Musculoskeletal:         General: Normal range of motion.      Cervical back: Neck supple.   Skin:     General: Skin is warm and dry.   Neurological:      General: No focal deficit present.      Mental Status: She is alert.   Psychiatric:         Mood and Affect: Mood normal.             ED Course     Labs Reviewed   URINALYSIS WITH CULTURE REFLEX - Abnormal; Notable for the following components:       Result Value    Urine Color Red (*)     Clarity Urine Turbid (*)     Glucose Urine 100 (*)     Ketones Urine 40 (*)     Blood Urine Large (*)     pH Urine 8.5 (*)     Protein Urine >=300 (*)     Urobilinogen Urine >=8.0 (*)     Nitrite Urine Positive (*)     Leukocyte Esterase Urine Large (*)     WBC Urine >50 (*)     RBC Urine >10 (*)     All other components within normal limits   UA MICROSCOPIC ONLY, URINE - Abnormal; Notable for the following components:    WBC Urine >50 (*)     RBC Urine >10 (*)     All other components within normal limits   COMP METABOLIC PANEL (14) - Abnormal; Notable for the following components:    Glucose 145 (*)     BUN 8 (*)     Bilirubin, Total 0.2 (*)     All other components within normal limits   CBC WITH DIFFERENTIAL WITH PLATELET - Abnormal; Notable for the following components:    WBC 13.8 (*)     MCV 79.3 (*)     Neutrophil Absolute Prelim 9.58 (*)     Neutrophil Absolute 9.58 (*)     All other components within normal limits   ICTOTEST - Normal    PROTHROMBIN TIME (PT) - Normal   POCT PREGNANCY URINE - Normal   URINE CULTURE, ROUTINE                   MDM      CT ABDOMEN+PELVIS(CONTRAST ONLY)(CPT=74177)    Result Date: 12/6/2024  CONCLUSION:  1. Mild urinary bladder wall thickening.  Correlate for cystitis.  2. No additional specific abnormality.   LOCATION:  [facility]   Dictated by (CST): Chi Chin MD on 12/06/2024 at 8:12 PM     Finalized by (CST): Chi Chin MD on 12/06/2024 at 8:16 PM        I independent interpreted the CT abdomen pelvis note the bladder wall thickening consistent with cystitis.  No obvious obstructive uropathy noted    Differential diagnosis includes, but not limited to, obstructive uropathy, gross hematuria, acute cystitis, anemia    Family at bedside helpful to provide information on the history presenting illness    External chart review demonstrates outpatient OB/GYN visit in September of this year    34-year-old female with gross hematuria, dysuria, passing clots.  Has acute cystitis on urinalysis, bladder wall thickening on her CT and renal cyst incidentally.  No signs of kidney stone/obstructive uropathy.  H&H stable.  Vital signs are stable.  Pain control with Toradol and morphine but she is pretty uncomfortable before this.  States her course hematuria is getting slightly worse and passing more small clots.  At this point CBI indicated.  She worked with Dr. Xiao, he is on-call today and aware.  Admitted to  Dr. Stacy, awaiting bed assignment.          Admission disposition: 12/6/2024  8:36 PM           Medical Decision Making      Disposition and Plan     Clinical Impression:  1. Acute cystitis with hematuria    2. Gross hematuria    3. Renal cyst    4. Hyperglycemia         Disposition:  Admit  12/6/2024  8:36 pm    Follow-up:  No follow-up provider specified.        Medications Prescribed:  Current Discharge Medication List              Supplementary Documentation:         Hospital Problems       Present on Admission   Date Reviewed: 9/26/2024            ICD-10-CM Noted POA    * (Principal) Acute cystitis with hematuria N30.01 12/6/2024 Unknown    Hyperglycemia R73.9 12/6/2024 Yes

## 2024-12-08 VITALS
WEIGHT: 200 LBS | TEMPERATURE: 98 F | BODY MASS INDEX: 34.15 KG/M2 | SYSTOLIC BLOOD PRESSURE: 123 MMHG | OXYGEN SATURATION: 99 % | HEART RATE: 103 BPM | DIASTOLIC BLOOD PRESSURE: 74 MMHG | HEIGHT: 64 IN | RESPIRATION RATE: 15 BRPM

## 2024-12-08 PROCEDURE — 99239 HOSP IP/OBS DSCHRG MGMT >30: CPT | Performed by: HOSPITALIST

## 2024-12-08 PROCEDURE — 99232 SBSQ HOSP IP/OBS MODERATE 35: CPT | Performed by: UROLOGY

## 2024-12-08 RX ORDER — OXYBUTYNIN CHLORIDE 5 MG/1
5 TABLET ORAL 3 TIMES DAILY PRN
Qty: 15 TABLET | Refills: 0 | Status: SHIPPED | OUTPATIENT
Start: 2024-12-08

## 2024-12-08 RX ORDER — CEFADROXIL 500 MG/1
500 CAPSULE ORAL 2 TIMES DAILY
Qty: 10 CAPSULE | Refills: 0 | Status: SHIPPED | OUTPATIENT
Start: 2024-12-08 | End: 2024-12-13

## 2024-12-08 RX ORDER — PHENAZOPYRIDINE HYDROCHLORIDE 200 MG/1
200 TABLET, FILM COATED ORAL 3 TIMES DAILY PRN
Qty: 15 TABLET | Refills: 0 | Status: SHIPPED | OUTPATIENT
Start: 2024-12-08

## 2024-12-08 RX ORDER — NORTRIPTYLINE HYDROCHLORIDE 10 MG/1
25 CAPSULE ORAL NIGHTLY
Status: SHIPPED | COMMUNITY
Start: 2024-12-08

## 2024-12-08 NOTE — PLAN OF CARE
Problem: PAIN - ADULT  Goal: Verbalizes/displays adequate comfort level or patient's stated pain goal  Description: INTERVENTIONS:  - Encourage pt to monitor pain and request assistance  - Assess pain using appropriate pain scale  - Administer analgesics based on type and severity of pain and evaluate response  - Implement non-pharmacological measures as appropriate and evaluate response  - Consider cultural and social influences on pain and pain management  - Manage/alleviate anxiety  - Utilize distraction and/or relaxation techniques  - Monitor for opioid side effects  - Notify MD/LIP if interventions unsuccessful or patient reports new pain  - Anticipate increased pain with activity and pre-medicate as appropriate  Outcome: Progressing     Problem: SAFETY ADULT - FALL  Goal: Free from fall injury  Description: INTERVENTIONS:  - Assess pt frequently for physical needs  - Identify cognitive and physical deficits and behaviors that affect risk of falls.  - [place] fall precautions as indicated by assessment.  - Educate pt/family on patient safety including physical limitations  - Instruct pt to call for assistance with activity based on assessment  - Modify environment to reduce risk of injury  - Provide assistive devices as appropriate  - Consider OT/PT consult to assist with strengthening/mobility  - Encourage toileting schedule  Outcome: Progressing   Patient alert x4, VSS. No complaint of pain over night. CBI continuously running. Clear output. CBI to be clamped at 0600 this morning. Pt slept well overnight. IV abx.  at bedside.

## 2024-12-08 NOTE — PROGRESS NOTES
[facility]   part of [facility]    Progress Note    Leena Norman Patient Status:  Inpatient    1990 MRN [id number]   Location [facility] 4NW-A Attending Rosalio Sorenson,    Hosp Day # 2 PCP Christa Ellis MD       Subjective:   Leena Norman is a(n) 34 year old female   Stable.  Feels better.,  Urine clear on slow rate CBI    Objective:   Blood pressure 106/62, pulse 78, temperature 98.6 °F (37 °C), temperature source Oral, resp. rate 15, height 5' 4\" (1.626 m), weight 200 lb (90.7 kg), last menstrual period 2024, SpO2 100%.    Urine clear in tubing    Assessment and Plan:     Acute cystitis with hematuria  E. Coli UTI.  On IV Rocephin.  Hospitalist wrote for 5 days opf Cefadroxil capsules 500 mg Po BID which should be ok.  Will remove alfaro and confirm good voiding.  Discussed with nurse , patient and  at the bedside.      Gross hematuria  Resolved.  Related to UTI      Renal cyst  Simple on CT.  Would observe for now.  Plan:  -Remove alfaro.    -Cefadroxil x 5 days on discharge.  -Followup with urology as a PRN basis.  Told her to use OTC azo as needed if she has dysuria next few days.  -Ok to discharge from  standpoint if stable this afternoon.  Will sign off.      Results:     Lab Results   Component Value Date    WBC 11.2 (H) 2024    HGB 12.2 2024    HCT 35.6 2024    .0 2024    CREATSERUM 0.64 2024    BUN 6 (L) 2024     2024    K 3.9 2024     2024    CO2 26.0 2024     (H) 2024    CA 8.7 2024    ALB 4.1 2024    ALKPHO 85 2024    AST 23 2024    ALT 19 2024    INR 0.96 2024         CT ABDOMEN+PELVIS(CONTRAST ONLY)(CPT=74177)    Result Date: 2024  CONCLUSION:  1. Mild urinary bladder wall thickening.  Correlate for cystitis.  2. No additional specific abnormality.   LOCATION:  [facility]   Dictated by (CST): Chi Chin MD on 2024  at 8:12 PM     Finalized by (CST): Chi Chin MD on 12/06/2024 at 8:16 PM            Shashank Xiao MD  12/8/2024

## 2024-12-08 NOTE — PLAN OF CARE
Pt A&Ox4, VSS, on room air. CBI was clamped prior to shift. Alfaro draining clear yellow urine. Pt's reporting 4/10 pain but declines analgesic medication. Received and carried out orders to remove alfaro. Alfaro removed without incident. Pt updated on plan to discharge later if she is able to void without complication. Pt educated on UTI management strategies. Pt and spouse updated on and in agreement with discharge and follow-up plans. Call light within reach, fall and safety precautions in place.     Problem: PAIN - ADULT  Goal: Verbalizes/displays adequate comfort level or patient's stated pain goal  Description: INTERVENTIONS:  - Encourage pt to monitor pain and request assistance  - Assess pain using appropriate pain scale  - Administer analgesics based on type and severity of pain and evaluate response  - Implement non-pharmacological measures as appropriate and evaluate response  - Consider cultural and social influences on pain and pain management  - Manage/alleviate anxiety  - Utilize distraction and/or relaxation techniques  - Monitor for opioid side effects  - Notify MD/LIP if interventions unsuccessful or patient reports new pain  - Anticipate increased pain with activity and pre-medicate as appropriate  Outcome: Adequate for Discharge     Problem: SAFETY ADULT - FALL  Goal: Free from fall injury  Description: INTERVENTIONS:  - Assess pt frequently for physical needs  - Identify cognitive and physical deficits and behaviors that affect risk of falls.  - [place] fall precautions as indicated by assessment.  - Educate pt/family on patient safety including physical limitations  - Instruct pt to call for assistance with activity based on assessment  - Modify environment to reduce risk of injury  - Provide assistive devices as appropriate  - Consider OT/PT consult to assist with strengthening/mobility  - Encourage toileting schedule  Outcome: Adequate for Discharge

## 2024-12-08 NOTE — PROGRESS NOTES
NURSING DISCHARGE NOTE    Pt educated on and in agreement with all discharge instructions and medications.    Discharged Home via Wheelchair.  Accompanied by RN  Belongings Taken by patient/family.

## 2024-12-08 NOTE — DISCHARGE SUMMARY
[facility]     Leena Norman Patient Status:  Inpatient    1990 MRN [id number]   Location [facility] 4NW-A Attending Rosalio Sorenson,    Hosp Day # 2 PCP Christa Ellis MD     Date of Admission: 2024  Date of Discharge:   2024    Discharge Disposition: Home or Self Care    Discharge Diagnosis:  Hemorrhagic cystitis   Anxiety     History of Present Illness: Leena Norman is a 34 year old female with no significant past medical history presented to the emergency room with gross hematuria which started today.  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  Per patient she was having severe lower abdominal cramping and pressure.  She had increased frequency and initially thought there was vaginal bleeding however she noted that it was in fact bleeding from her urethra.    Patient is not on any anticoagulation.  No recent fever or chills, nausea or vomiting, diarrhea.  Denies any flank pain.  Patient states that she had intercourse with her  yesterday but no other significant trauma.    Brief Synopsis:     #Hemorrhagic cystitis  -Imaging without bladder mass  -Urine cleared with CBI  -Rocephin for E. Coli UTI - complete 5 day course of antibiotics at [facility]  -Continue rocephin pending cultures     #Anxiety  -Continue home meds    #Disposition  -Stable for DC home     Lace+ Score: 18  59-90 High Risk  29-58 Medium Risk  0-28   Low Risk       TCM Follow-Up Recommendation:  LACE < 29: Low Risk of readmission after discharge from the hospital. No TCM follow-up needed.      Procedures during hospitalization:   None    Incidental or significant findings and recommendations (brief descriptions):  None    Lab/Test results pending at Discharge:   None    Consultants:  Urology     Discharge Medication List:     Discharge Medications        START taking these medications        Instructions Prescription details   cefadroxil 500 MG  Caps  Commonly known as: DURICEF      Take 1 capsule (500 mg total) by mouth 2 (two) times daily for 5 days.   Stop taking on: December 13, 2024  Quantity: 10 capsule  Refills: 0     oxybutynin 5 MG Tabs  Commonly known as: Ditropan      Take 1 tablet (5 mg total) by mouth 3 (three) times daily as needed (bladder spasm).   Quantity: 15 tablet  Refills: 0     phenazopyridine 200 MG Tabs  Commonly known as: Pyridum      Take 1 tablet (200 mg total) by mouth 3 (three) times daily as needed for Pain.   Quantity: 15 tablet  Refills: 0            CHANGE how you take these medications        Instructions Prescription details   nortriptyline 10 MG Caps  Commonly known as: Pamelor  What changed: how much to take      Take 3 capsules (30 mg total) by mouth nightly.   Refills: 0            CONTINUE taking these medications        Instructions Prescription details   albuterol 108 (90 Base) MCG/ACT Aers  Commonly known as: ProAir HFA      Inhale 2 puffs into the lungs every 4 (four) hours as needed for Wheezing or Shortness of Breath.   Quantity: 8.5 each  Refills: 2     ALPRAZolam 0.25 MG Tabs  Commonly known as: Xanax      Take 1 tablet (0.25 mg total) by mouth nightly as needed for Anxiety. To take once per day as needed for anxiety. Can cause sedation/ fatigue.   Quantity: 30 tablet  Refills: 0     methocarbamol 750 MG Tabs  Commonly known as: Robaxin      Take 1 tablet (750 mg total) by mouth 4 (four) times daily as needed (pain).   Quantity: 60 tablet  Refills: 3     Propranolol HCl ER 60 MG Cp24  Commonly known as: INDERAL LA      Take 1 capsule (60 mg total) by mouth daily.   Quantity: 90 capsule  Refills: 3               Where to Get Your Medications        These medications were sent to [facility]/pharmacy #[id number] - [place], [place] - [address] [facility] [address] [facility] [phone], [phone]  [address] [place] [address]      Phone: [phone]   cefadroxil 500 MG Caps  oxybutynin 5 MG Tabs  phenazopyridine 200 MG Tabs          [facility] reviewed: N/A    Follow-up appointment:   No follow-up provider specified.  Appointments for Next 30 Days 2024 - 2025        Date Arrival Time Visit Type Length Department Provider     2024  8:40 AM  EXAM - ESTABLISHED [2844] 20 min [facility] - OB/GYN Nissa Jordan APRN    Patient Instructions:         Location Instructions:     Your appointment is located at [address] [facility] in [place], [place].&nbsp; Please park in the Yellow lot and enter through the [facility] entrance.&nbsp; Then proceed to [address].  Masks are optional for all patients and visitors, unless otherwise indicated.                      Vital signs:  Temp:  [97.9 °F (36.6 °C)-98.8 °F (37.1 °C)] 98.2 °F (36.8 °C)  Pulse:  [] 103  Resp:  [15-18] 15  BP: (101-123)/(59-74) 123/74  SpO2:  [98 %-100 %] 99 %    Physical Exam:    General: No acute distress   Lungs: clear to auscultation  Cardiovascular: S1, S2  Abdomen: Soft    -----------------------------------------------------------------------------------------------  PATIENT DISCHARGE INSTRUCTIONS: See electronic chart    Rosalio Sorenson DO    Total time spent on discharge plannin minutes     The  [place] Cures Act makes medical notes like these available to patients in the interest of transparency. Please be advised this is a medical document. Medical documents are intended to carry relevant information, facts as evident, and the clinical opinion of the practitioner. The medical note is intended as peer to peer communication and may appear blunt or direct. It is written in medical language and may contain abbreviations or verbiage that are unfamiliar.

## 2024-12-11 NOTE — PAYOR COMM NOTE
--------------  ADMISSION REVIEW     Payor: MELLY JUAN  Subscriber #:  [id number]  Authorization Number: [id number]    Admit date: 12/6/24  Admit time: 11:55 PM       REVIEW DOCUMENTATION:     ED Provider Notes        ED Provider Notes signed by Juan Fuller DO at 12/6/2024  8:36 PM       Author: Juan Fuller DO Service: -- Author Type: Physician    Filed: 12/6/2024  8:36 PM Date of Service: 12/6/2024  6:07 PM Status: Signed    : Juan Fuller DO (Physician)           Patient Seen in: [facility] Emergency Department      History     Chief Complaint   Patient presents with    Urinary Symptoms     Stated Complaint: Blood in urine since 1100, pain    Subjective:   34-year-old female, history of asthma, GERD, presents with hematuria, started today.  Yesterday was fine.  No stiff kidney stone but intermittent some lower quadrant cramping over the past couple of days to weeks.  Today started having gross hematuria when she voids, not vaginal bleeding.  No GI bleeding.  Burning as well.  No history of recurrent UTI.  LMP was about 2 weeks ago.  No trauma.  No fever.  No vomiting.              Objective:     Past Medical History:    Allergic rhinitis    Anxiety    Asthma (HCC)    Esophageal reflux    Headache    Constant headaches and migrains on a daily basis    Visual impairment              History reviewed. No pertinent surgical history.             Social History     Socioeconomic History    Marital status:    Tobacco Use    Smoking status: Never    Smokeless tobacco: Never   Vaping Use    Vaping status: Never Used   Substance and Sexual Activity    Alcohol use: Not Currently     Alcohol/week: 2.0 standard drinks of alcohol     Types: 2 Glasses of wine per week     Comment: 2-3 drinks a month    Drug use: Yes     Types: Cannabis     Comment: once every 3 months    Sexual activity: Yes     Partners: Male   Other Topics Concern    Caffeine Concern Yes    Stress Concern Yes    Weight  Concern Yes    Exercise Yes    Seat Belt Yes    Self-Exams Yes     Comment: self breast exam 1-2 times a year                  Physical Exam     ED Triage Vitals [12/06/24 1619]   /83   Pulse 101   Resp 18   Temp 98.2 °F (36.8 °C)   Temp src Temporal   SpO2 99 %   O2 Device None (Room air)       Current Vitals:   Vital Signs  BP: 121/70  Pulse: 87  Resp: 18  Temp: 98.2 °F (36.8 °C)  Temp src: Temporal  MAP (mmHg): 85    Oxygen Therapy  SpO2: 99 %  O2 Device: None (Room air)        Physical Exam  Vitals and nursing note reviewed.   HENT:      Head: Normocephalic.   Cardiovascular:      Rate and Rhythm: Normal rate.      Heart sounds: Normal heart sounds.   Pulmonary:      Effort: Pulmonary effort is normal. No respiratory distress.      Breath sounds: Normal breath sounds.   Abdominal:      Palpations: Abdomen is soft.      Comments: Some suprapubic fullness and tenderness without rebound or guarding.  Bedside ultrasound without any urinary retention.  Decompressed bladder, lots of bowel gas   Musculoskeletal:         General: Normal range of motion.      Cervical back: Neck supple.   Skin:     General: Skin is warm and dry.   Neurological:      General: No focal deficit present.      Mental Status: She is alert.   Psychiatric:         Mood and Affect: Mood normal.             ED Course     Labs Reviewed   URINALYSIS WITH CULTURE REFLEX - Abnormal; Notable for the following components:       Result Value    Urine Color Red (*)     Clarity Urine Turbid (*)     Glucose Urine 100 (*)     Ketones Urine 40 (*)     Blood Urine Large (*)     pH Urine 8.5 (*)     Protein Urine >=300 (*)     Urobilinogen Urine >=8.0 (*)     Nitrite Urine Positive (*)     Leukocyte Esterase Urine Large (*)     WBC Urine >50 (*)     RBC Urine >10 (*)     All other components within normal limits   UA MICROSCOPIC ONLY, URINE - Abnormal; Notable for the following components:    WBC Urine >50 (*)     RBC Urine >10 (*)     All other  components within normal limits   COMP METABOLIC PANEL (14) - Abnormal; Notable for the following components:    Glucose 145 (*)     BUN 8 (*)     Bilirubin, Total 0.2 (*)     All other components within normal limits   CBC WITH DIFFERENTIAL WITH PLATELET - Abnormal; Notable for the following components:    WBC 13.8 (*)     MCV 79.3 (*)     Neutrophil Absolute Prelim 9.58 (*)     Neutrophil Absolute 9.58 (*)     All other components within normal limits   ICTOTEST - Normal   PROTHROMBIN TIME (PT) - Normal   POCT PREGNANCY URINE - Normal   URINE CULTURE, ROUTINE                  MDM      CT ABDOMEN+PELVIS(CONTRAST ONLY)(CPT=74177)    Result Date: 12/6/2024  CONCLUSION:  1. Mild urinary bladder wall thickening.  Correlate for cystitis.  2. No additional specific abnormality.   LOCATION:  [facility]   Dictated by (CST): Chi Chin MD on 12/06/2024 at 8:12 PM     Finalized by (CST): Chi Chin MD on 12/06/2024 at 8:16 PM        I independent interpreted the CT abdomen pelvis note the bladder wall thickening consistent with cystitis.  No obvious obstructive uropathy noted    Differential diagnosis includes, but not limited to, obstructive uropathy, gross hematuria, acute cystitis, anemia    Family at bedside helpful to provide information on the history presenting illness    External chart review demonstrates outpatient OB/GYN visit in September of this year    34-year-old female with gross hematuria, dysuria, passing clots.  Has acute cystitis on urinalysis, bladder wall thickening on her CT and renal cyst incidentally.  No signs of kidney stone/obstructive uropathy.  H&H stable.  Vital signs are stable.  Pain control with Toradol and morphine but she is pretty uncomfortable before this.  States her course hematuria is getting slightly worse and passing more small clots.  At this point CBI indicated.  She worked with Dr. Xiao, he is on-call today and aware.  Admitted to  Dr. Stacy, awaiting bed  assignment.          Admission disposition: 2024  8:36 PM           Medical Decision Making      Disposition and Plan     Clinical Impression:  1. Acute cystitis with hematuria    2. Gross hematuria    3. Renal cyst    4. Hyperglycemia         Disposition:  Admit  2024  8:36 pm    Follow-up:  No follow-up provider specified.        Medications Prescribed:  Current Discharge Medication List              Supplementary Documentation:         Hospital Problems       Present on Admission  Date Reviewed: 2024            ICD-10-CM Noted POA    * (Principal) Acute cystitis with hematuria N30.01 2024 Unknown    Hyperglycemia R73.9 2024 Yes                Signed by Juan Fuller DO on 2024  8:36 PM         MEDICATIONS ADMINISTERED IN LAST 1 DAY:  Administration History       None            Vitals (last day) before discharge       Date/Time Temp Pulse Resp BP SpO2 Weight O2 Device O2 Flow Rate (L/min) [facility]    24 1253 98.2 °F (36.8 °C) 103 15 123/74 99 % -- None (Room air) -- DK    24 0826 98.6 °F (37 °C) 78 15 106/62 100 % -- None (Room air) -- DK    24 0531 97.9 °F (36.6 °C) 86 16 123/68 99 % -- None (Room air) -- LP    24 0035 98 °F (36.7 °C) 79 16 101/63 98 % -- None (Room air) -- LP    24 98.5 °F (36.9 °C) 84 16 104/59 98 % -- None (Room air) -- LP    24 1628 98.8 °F (37.1 °C) 94 18 116/63 99 % -- -- -- KS    24 1241 98.4 °F (36.9 °C) -- 16 -- -- -- -- -- JS    24 0829 97.6 °F (36.4 °C) 75 18 103/62 100 % -- -- -- KS    24 0417 97.7 °F (36.5 °C) 80 -- 124/73 100 % -- None (Room air) -- PW    24 0014 -- -- -- -- -- 200 lb (90.7 kg) -- -- CC    24 0010 97.9 °F (36.6 °C) 78 18 106/65 98 % -- None (Room air) -- CC          [facility]            AlesiaMelanie Norman Patient Status:  Inpatient    1990 MRN [id number]   Location [facility] 4NW-A Attending Jyoti Nickerson MD    Hosp Day # 1 PCP Christa Ellis MD      Chief Complaint: hematuria         Subjective:  History of Present Illness:      Leena Norman is a 34 year old female with no significant past medical history presented to the emergency room with gross hematuria which started today.  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  Per patient she was having severe lower abdominal cramping and pressure.  She had increased frequency and initially thought there was vaginal bleeding however she noted that it was in fact bleeding from her urethra.    Patient is not on any anticoagulation.  No recent fever or chills, nausea or vomiting, diarrhea.  Denies any flank pain.  Patient states that she had intercourse with her  yesterday but no other significant trauma.        History/Other:  Past Medical History:  Past Medical History       Past Medical History:    Allergic rhinitis    Anxiety    Asthma (HCC)    Esophageal reflux    Headache     Constant headaches and migrains on a daily basis    Visual impairment        Past Surgical History:   Past Surgical History   History reviewed. No pertinent surgical history.      Family History:   Family History         Family History   Problem Relation Age of Onset    Cancer Mother 67         Mesothelioma    Heart Disorder Father           Due to smoking    Hypertension Father           Due to smoking        Social History:    reports that she has never smoked. She has never used smokeless tobacco. She reports that she does not currently use alcohol after a past usage of about 2.0 standard drinks of alcohol per week. She reports current drug use. Drug: Cannabis.      Allergies: [Allergies]    [Allergies]  No Known Allergies     Medications:  [Medications Ordered Prior to Encounter]    [Medications Ordered Prior to Encounter]  No current facility-administered medications on file prior to encounter.             Current Outpatient Medications on File  Prior to Encounter   Medication Sig Dispense Refill    nortriptyline 10 MG Oral Cap Take 1 capsule (10 mg total) by mouth nightly. (Patient taking differently: Take 25 mg by mouth nightly.) 90 capsule 3    Propranolol HCl ER 60 MG Oral Capsule SR 24 Hr Take 1 capsule (60 mg total) by mouth daily. 90 capsule 3    methocarbamol 750 MG Oral Tab Take 1 tablet (750 mg total) by mouth 4 (four) times daily as needed (pain). 60 tablet 3    ALPRAZolam (XANAX) 0.25 MG Oral Tab Take 1 tablet (0.25 mg total) by mouth nightly as needed for Anxiety. To take once per day as needed for anxiety. Can cause sedation/ fatigue. 30 tablet 0    albuterol (PROAIR HFA) 108 (90 Base) MCG/ACT Inhalation Aero Soln Inhale 2 puffs into the lungs every 4 (four) hours as needed for Wheezing or Shortness of Breath. 8.5 each 2        Review of Systems:   A comprehensive review of systems was completed.    Pertinent positives and negatives noted in the HPI.        Objective:  Physical Exam:    /65 (BP Location: Left arm)   Pulse 78   Temp 97.9 °F (36.6 °C) (Oral)   Resp 18   Ht 5' 4\" (1.626 m)   Wt 200 lb (90.7 kg)   LMP 11/26/2024 (Exact Date)   SpO2 98%   BMI 34.33 kg/m²   General: No acute distress, Alert  Respiratory: No rhonchi, no wheezes  Cardiovascular: S1, S2. Regular rate and rhythm  Abdomen: Soft, + tenderness to palpation in lower quadrants and suprapubic area  Neuro: No new focal deficits  Extremities: No edema           Results:  Labs:        Labs Last 24 Hours:         Recent Labs   Lab 12/06/24  1734   RBC 4.50   HGB 12.2   HCT 35.7   MCV 79.3*   MCH 27.1   MCHC 34.2   RDW 12.9   NEPRELIM 9.58*   WBC 13.8*   .0             Recent Labs   Lab 12/06/24  1734   *   BUN 8*   CREATSERUM 0.83   EGFRCR 94   CA 9.4   ALB 4.1      K 3.7      CO2 28.0   ALKPHO 85   AST 23   ALT 19   BILT 0.2*   TP 6.7               Lab Results   Component Value Date     INR 0.96 12/06/2024         No results for input(s):  \"TROP\", \"TROPHS\", \"CK\" in the last 168 hours.     No results for input(s): \"TROP\", \"PBNP\" in the last 168 hours.     No results for input(s): \"PCT\" in the last 168 hours.     Imaging: Imaging data reviewed in Epic.        Assessment & Plan:  # Acute cystitis with hematuria  - cont abx   - Ucx  - IVF     # gross hematuria  - pain control   - CBI  - urology consulted  - NPO after midnight      # Anxiety, resume home meds  # Hyperglycemia  - HgA1c        Plan of care discussed with patient , spouse, ER.      Jyoti Nickerson MD    2024 Hospitalist Progress Note   [facility]   part of [facility]     Hospitalist Progress Note            Leena Norman Patient Status:  Inpatient    1990 MRN [id number]   [facility] 4NW-A Attending Rosalio Sorenson,    Hosp Day # 1 PCP Christa Ellis MD      Chief Complaint: Hematuria        Subjective:  Patient seen and examined. Reports waxing and waning abdominal pain.         Objective:  Review of Systems:   A comprehensive review of systems was completed; pertinent positive and negatives stated in subjective.     Vital signs:  Temp:  [97.6 °F (36.4 °C)-98.2 °F (36.8 °C)] 97.6 °F (36.4 °C)  Pulse:  [] 75  Resp:  [16-18] 18  BP: (103-135)/(62-83) 103/62  SpO2:  [95 %-100 %] 100 %     Physical Exam:    General: No acute distress  Respiratory: No wheezes, no rhonchi  Cardiovascular: S1, S2, regular rate and rhythm  Abdomen: Soft, suprapubic tenderness, non-distended, positive bowel sounds  Neuro: No new focal deficits.   Extremities: No edema     Diagnostic Data:    Labs:       Recent Labs   Lab 24  1734 24  0459   WBC 13.8* 11.2*   HGB 12.2 12.2   MCV 79.3* 81.5   .0 306.0   INR 0.96  --               Recent Labs   Lab 244 24  0459   * 109*   BUN 8* 6*   CREATSERUM 0.83 0.64   CA 9.4 8.7   ALB 4.1  --     140   K 3.7 3.9    109   CO2 28.0 26.0   ALKPHO 85  --    AST 23  --    ALT 19  --     BILT 0.2*  --    TP 6.7  --          Estimated Creatinine Clearance: 105.9 mL/min (based on SCr of 0.64 mg/dL).     No results for input(s): \"TROP\", \"TROPHS\", \"CK\" in the last 168 hours.         Recent Labs   Lab 24  1734   PTP 12.9   INR 0.96                     Microbiology           Hospital Encounter on 24   1. Urine Culture, Routine     Status: Abnormal (Preliminary result)     Collection Time: 24  4:08 PM     Specimen: Urine, clean catch   Result Value Ref Range     Urine Culture >100,000 CFU/ML Escherichia coli (A) N/A            Imaging: Reviewed in Epic.     Medications:   Scheduled Medications    nortriptyline  25 mg Oral Nightly    propranolol  20 mg Oral QID    cefTRIAXone  1 g Intravenous Q24H               Assessment & Plan:  #Gross hematuria  #Abdominal pain  -Suspect hemorrhagic cystitis  -Imaging without bladder mass  -CBI  -Pending urology eval  -Continue rocephin pending cultures     #Anxiety  -Continue home meds     DO Yas Flynn Neal, DO   Physician  Hospitalist     Discharge Summary     Signed     Date of Service: 2024  1:08 PM     Signed         [facility]            Leena Norman Patient Status:  Inpatient    1990 MRN [id number]   Location [facility] 4NW-A Attending Rosalio Sorenson DO   Hosp Day # 2 PCP Christa Ellis MD      Date of Admission: 2024  Date of Discharge:   2024     Discharge Disposition: Home or Self Care     Discharge Diagnosis:  Hemorrhagic cystitis   Anxiety      History of Present Illness: Leena Norman is a 34 year old female with no significant past medical history presented to the emergency room with gross hematuria which started today.  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  Per patient she was having severe lower abdominal cramping and pressure.  She had increased frequency and initially thought there was vaginal bleeding however  she noted that it was in fact bleeding from her urethra.    Patient is not on any anticoagulation.  No recent fever or chills, nausea or vomiting, diarrhea.  Denies any flank pain.  Patient states that she had intercourse with her  yesterday but no other significant trauma.     Brief Synopsis:      #Hemorrhagic cystitis  -Imaging without bladder mass  -Urine cleared with CBI  -Rocephin for E. Coli UTI - complete 5 day course of antibiotics at DC  -Continue rocephin pending cultures     #Anxiety  -Continue home meds     #Disposition  -Stable for DC home      Lace+ Score: 18  59-90 High Risk  29-58 Medium Risk  0-28   Low Risk     TCM Follow-Up Recommendation:  LACE < 29: Low Risk of readmission after discharge from the hospital. No TCM follow-up needed.        Procedures during hospitalization:   None     Incidental or significant findings and recommendations (brief descriptions):  None     Lab/Test results pending at Discharge:   None     Consultants:  Urology      Discharge Medication List:      Discharge Medications          START taking these medications         Instructions Prescription details   cefadroxil 500 MG Caps  Commonly known as: DURICEF       Take 1 capsule (500 mg total) by mouth 2 (two) times daily for 5 days.    Stop taking on: December 13, 2024  Quantity: 10 capsule  Refills: 0      oxybutynin 5 MG Tabs  Commonly known as: Ditropan       Take 1 tablet (5 mg total) by mouth 3 (three) times daily as needed (bladder spasm).    Quantity: 15 tablet  Refills: 0      phenazopyridine 200 MG Tabs  Commonly known as: Pyridum       Take 1 tablet (200 mg total) by mouth 3 (three) times daily as needed for Pain.    Quantity: 15 tablet  Refills: 0                CHANGE how you take these medications         Instructions Prescription details   nortriptyline 10 MG Caps  Commonly known as: Pamelor  What changed: how much to take       Take 3 capsules (30 mg total) by mouth nightly.    Refills: 0                 CONTINUE taking these medications         Instructions Prescription details   albuterol 108 (90 Base) MCG/ACT Aers  Commonly known as: ProAir HFA       Inhale 2 puffs into the lungs every 4 (four) hours as needed for Wheezing or Shortness of Breath.    Quantity: 8.5 each  Refills: 2      ALPRAZolam 0.25 MG Tabs  Commonly known as: Xanax       Take 1 tablet (0.25 mg total) by mouth nightly as needed for Anxiety. To take once per day as needed for anxiety. Can cause sedation/ fatigue.    Quantity: 30 tablet  Refills: 0      methocarbamol 750 MG Tabs  Commonly known as: Robaxin       Take 1 tablet (750 mg total) by mouth 4 (four) times daily as needed (pain).    Quantity: 60 tablet  Refills: 3      Propranolol HCl ER 60 MG Cp24  Commonly known as: INDERAL LA       Take 1 capsule (60 mg total) by mouth daily.    Quantity: 90 capsule  Refills: 3                    Where to Get Your Medications          These medications were sent to [facility]/pharmacy #[id number] - [place], [place] - [address] [phone], [phone]  [address] [place] [address]        Phone: [phone]   cefadroxil 500 MG Caps  oxybutynin 5 MG Tabs  phenazopyridine 200 MG Tabs            [place]     Follow-up appointment:   No follow-up provider specified.  Appointments for Next 30 Days 12/8/2024 - 1/7/2025          Date Arrival Time Visit Type Length Department Provider       12/13/2024  8:40 AM   EXAM - ESTABLISHED [8334] 20 min [facility] - OB/GYN Nissa Jordan APRN     Patient Instructions:            Location Instructions:      Your appointment is located at [address] [facility] in [place], [place].&nbsp; Please park in the Yellow lot and enter through the [facility] entrance.&nbsp; Then proceed to [address].  Masks are optional for all patients and visitors, unless otherwise indicated.                                  Vital signs:  Temp:  [97.9 °F (36.6 °C)-98.8 °F (37.1 °C)]  98.2 °F (36.8 °C)  Pulse:  [] 103  Resp:  [15-18] 15  BP: (101-123)/(59-74) 123/74  SpO2:  [98 %-100 %] 99 %     Physical Exam:    General: No acute distress   Lungs: clear to auscultation  Cardiovascular: S1, S2  Abdomen: Soft     -----------------------------------------------------------------------------------------------  PATIENT DISCHARGE INSTRUCTIONS: See electronic chart     Rosalio Sorenson DO

## 2025-01-16 DIAGNOSIS — F41.9 ANXIETY: ICD-10-CM

## 2025-01-20 NOTE — TELEPHONE ENCOUNTER
Please review; protocol failed/No Protocol    Last Written: 01/18/2024    Last Office Visit: 01/18/2024  Future Appointments   Date Time Provider Department Center   2/3/2025  2:00 PM Christa Ellis MD [facility]       Requested Prescriptions   Pending Prescriptions Disp Refills    ALPRAZOLAM 0.25 MG Oral Tab [Pharmacy Med Name: ALPRAZOLAM 0.25 MG TABLET] 30 tablet 0     Sig: TAKE 1 TABLET BY MOUTH NIGHTLY AS NEEDED FOR ANXIETY. CAN CAUSE SEDATION/FATIGUE.       Controlled Substance Medication Failed - 1/20/2025  5:20 PM        Failed - This medication is a controlled substance - forward to provider to refill        Passed - Medication is active on med list           Future Appointments         Provider Department Appt Notes    In 2 weeks Christa Ellis MD [facility] Physical          Recent Outpatient Visits              3 months ago Encounter for preconception consultation    [facility] - OB/GYN Gumaro Reyes,     Office Visit    5 months ago Chronic migraine with aura without status migrainosus, not intractable    [facility] Oniel Curiel MD    Office Visit    7 months ago Chronic migraine with aura without status migrainosus, not intractable    [facility] Van Marie DO    Office Visit    1 year ago Well adult exam    [facility] Christa Ellis MD    Office Visit    1 year ago Need for HPV vaccination    [address]    Nurse Only

## 2025-01-21 RX ORDER — ALPRAZOLAM 0.25 MG/1
0.25 TABLET ORAL NIGHTLY PRN
Qty: 30 TABLET | Refills: 0 | Status: SHIPPED | OUTPATIENT
Start: 2025-01-21

## 2025-02-04 ENCOUNTER — NURSE TRIAGE (OUTPATIENT)
Dept: FAMILY MEDICINE CLINIC | Facility: CLINIC | Age: 35
End: 2025-02-04

## 2025-02-04 ENCOUNTER — TELEMEDICINE (OUTPATIENT)
Dept: FAMILY MEDICINE CLINIC | Facility: CLINIC | Age: 35
End: 2025-02-04
Payer: COMMERCIAL

## 2025-02-04 DIAGNOSIS — B00.1 HERPES LABIALIS: Primary | ICD-10-CM

## 2025-02-04 PROCEDURE — 98004 SYNCH AUDIO-VIDEO EST SF 10: CPT | Performed by: NURSE PRACTITIONER

## 2025-02-04 RX ORDER — VALACYCLOVIR HYDROCHLORIDE 1 G/1
2000 TABLET, FILM COATED ORAL EVERY 12 HOURS SCHEDULED
Qty: 8 TABLET | Refills: 2 | Status: SHIPPED | OUTPATIENT
Start: 2025-02-04 | End: 2025-02-05

## 2025-02-04 NOTE — TELEPHONE ENCOUNTER
Please reply to pool: EM RN TRIAGE  Action Requested: Summary for Provider     []  Critical Lab, Recommendations Needed  [] Need Additional Advice  [x]   FYI    []   Need Orders  [] Need Medications Sent to Pharmacy  []  Other     SUMMARY: Patient contacts clinic reporting cold sore to upper lip requesting acyclovir or other antiviral.  Has a long standing history of cold sores, has used antivirals in the remote past.  Using lysine supplement currently. Started off as a small bump on her upper lip and today is \"angry\" red and swollen and painful.  Acute virtual visit booked, patient last seen over 1 year ago.     Reason for call: Mouth Cold Sores  Onset: Data Unavailable                       Reason for Disposition   Red streak or red area spreading from the cold sore    Protocols used: Cold Sores (Fever Blisters)-A-OH

## 2025-02-05 NOTE — PROGRESS NOTES
HPI  Video visit.   Pt presents for development of cold sore to her right upper lip this am.  Has not had a cold sore since hs and used to take an antiviral. Started taking lysine.  Has a hard time swallowing large tablets so prefers capsules if available.     Review of Systems   Constitutional:  Negative for activity change, appetite change and fever.   Skin:  Positive for color change and wound.       There were no vitals filed for this visit.  Patient's last menstrual period was 11/26/2024 (exact date).  There is no height or weight on file to calculate BMI.  Wt Readings from Last 6 Encounters:   12/07/24 200 lb (90.7 kg)   09/25/24 205 lb (93 kg)   06/19/24 200 lb (90.7 kg)   04/26/24 200 lb (90.7 kg)   01/18/24 204 lb 9.6 oz (92.8 kg)   04/14/23 196 lb 13.9 oz (89.3 kg)      BP Readings from Last 5 Encounters:   12/08/24 123/74   09/25/24 110/79   04/26/24 111/74   01/18/24 112/74   04/14/23 124/84       Health Maintenance   Topic Date Due    DTaP,Tdap,and Td Vaccines (1 - Tdap) Never done    COVID-19 Vaccine (4 - 2024-25 season) 09/01/2024    Influenza Vaccine (1) 10/01/2024    Annual Depression Screening  01/01/2025    Annual Physical  01/18/2025    Pap Smear  12/06/2025    HPV Vaccines Ages 27-45  Completed    Pneumococcal Vaccine: Birth to 50yrs  Aged Out    Meningococcal B Vaccine  Aged Out       Patient's last menstrual period was 11/26/2024 (exact date).    Past Medical History:    Allergic rhinitis    Anxiety    Asthma (HCC)    Esophageal reflux    Headache    Constant headaches and migrains on a daily basis    Visual impairment       .No past surgical history on file.    Family History   Problem Relation Age of Onset    Cancer Mother 67        Mesothelioma    Heart Disorder Father         Due to smoking    Hypertension Father         Due to smoking       Social History     Socioeconomic History    Marital status:      Spouse name: Not on file    Number of children: Not on file    Years of  education: Not on file    Highest education level: Not on file   Occupational History    Not on file   Tobacco Use    Smoking status: Never    Smokeless tobacco: Never   Vaping Use    Vaping status: Never Used   Substance and Sexual Activity    Alcohol use: Not Currently     Alcohol/week: 2.0 standard drinks of alcohol     Types: 2 Glasses of wine per week     Comment: 2-3 drinks a month    Drug use: Yes     Types: Cannabis     Comment: once every 3 months    Sexual activity: Yes     Partners: Male   Other Topics Concern     Service Not Asked    Blood Transfusions Not Asked    Caffeine Concern Yes    Occupational Exposure Not Asked    Hobby Hazards Not Asked    Sleep Concern Not Asked    Stress Concern Yes    Weight Concern Yes    Special Diet Not Asked    Back Care Not Asked    Exercise Yes    Bike Helmet Not Asked    Seat Belt Yes    Self-Exams Yes     Comment: self breast exam 1-2 times a year   Social History Narrative    Not on file     Social Drivers of Health     Food Insecurity: No Food Insecurity (12/7/2024)    Food Insecurity     Food Insecurity: Never true   Transportation Needs: No Transportation Needs (12/7/2024)    Transportation Needs     Lack of Transportation: No     Car Seat: Not on file   Stress: Not on file   Housing Stability: Low Risk  (12/7/2024)    Housing Stability     Housing Instability: No     Housing Instability Emergency: Not on file     Crib or Bassinette: Not on file       Current Outpatient Medications   Medication Sig Dispense Refill    valACYclovir 1 G Oral Tab Take 2 tablets (2,000 mg total) by mouth every 12 (twelve) hours for 2 doses. 8 tablet 2    ALPRAZolam 0.25 MG Oral Tab Take 1 tablet (0.25 mg total) by mouth nightly as needed for Anxiety. 30 tablet 0    nortriptyline 10 MG Oral Cap Take 3 capsules (30 mg total) by mouth nightly.      phenazopyridine 200 MG Oral Tab Take 1 tablet (200 mg total) by mouth 3 (three) times daily as needed for Pain. 15 tablet 0     oxybutynin 5 MG Oral Tab Take 1 tablet (5 mg total) by mouth 3 (three) times daily as needed (bladder spasm). 15 tablet 0    Propranolol HCl ER 60 MG Oral Capsule SR 24 Hr Take 1 capsule (60 mg total) by mouth daily. 90 capsule 3    methocarbamol 750 MG Oral Tab Take 1 tablet (750 mg total) by mouth 4 (four) times daily as needed (pain). 60 tablet 3    albuterol (PROAIR HFA) 108 (90 Base) MCG/ACT Inhalation Aero Soln Inhale 2 puffs into the lungs every 4 (four) hours as needed for Wheezing or Shortness of Breath. 8.5 each 2       Allergies:  Allergies[1]    Physical Exam  Nursing note reviewed.   Constitutional:       Appearance: She is obese.   HENT:      Head: Normocephalic.        Mouth/Throat:      Lips: Lesions present.     Pulmonary:      Effort: Pulmonary effort is normal. No respiratory distress.      Comments: No coughing, audible wheezing, shortness of breath or difficulty talking in full sentences.       Neurological:      Mental Status: She is alert and oriented to person, place, and time.         Assessment and Plan:  Problem List Items Addressed This Visit       Herpes labialis - Primary     Valtrex 2 gm q 12 hours x 2 doses  Skin care discussed-avoid lip contact or sharing glasses or eating utensils until lip sore is healed   Please call if symptoms worsen or are not resolving.           Relevant Medications    valACYclovir 1 G Oral Tab                   Discussed plan of care with pt and pt is in agreement.All questions answered. Pt to call with questions or concerns.    Encouraged to sign up for My Chart if not already registered.     Note to patient and family:  The 21st Century Cures Act makes medical notes available to patients in the interest of transparency.  However, please be advised that this is a medical document.  It is intended as a peer to peer communication.  It is written in medical language and may contain abbreviations or verbiage that are technical and unfamiliar.  It may appear  blunt or direct.  Medical documents are intended to carry relevant information, facts as evident, and the clinical opinion of the practitioner.       [1] No Known Allergies

## 2025-02-05 NOTE — ASSESSMENT & PLAN NOTE
Valtrex 2 gm q 12 hours x 2 doses  Skin care discussed-avoid lip contact or sharing glasses or eating utensils until lip sore is healed   Please call if symptoms worsen or are not resolving.

## 2025-03-03 ENCOUNTER — TELEPHONE (OUTPATIENT)
Dept: OBGYN CLINIC | Facility: CLINIC | Age: 35
End: 2025-03-03

## 2025-03-03 NOTE — TELEPHONE ENCOUNTER
Patients LMP of 2/3/2025 making her 4w0d. Pt states regular cycles of every 24-25 days. States +UPT.  Patient informed of group practice model  of both male and female providers. Patient  accepts rotation and wishes to proceed with establishing care. Patient instructed to start OTC PNV with DHA, FOLIC ACID AND IRON.  Patient accepts PC OBN on 3/29/2025    Stated HT: 5 ft 4 in  Stated Pre-pregnancy WT: 199 lb

## 2025-03-05 ENCOUNTER — TELEPHONE (OUTPATIENT)
Dept: FAMILY MEDICINE CLINIC | Facility: CLINIC | Age: 35
End: 2025-03-05

## 2025-03-05 DIAGNOSIS — B00.1 HERPES LABIALIS: Primary | ICD-10-CM

## 2025-03-05 NOTE — TELEPHONE ENCOUNTER
Patient had telemedicine visit on 02/04 for cold sores.  Prescribed valacyclovir to be taken for 2 doses when outbreaks occur.  She continues to have new lesions in different spots on her mouth.  Medication helps but new sore always returns.  She inquires on longer dose of medication or other recommendation.      She also reports she had a positive home pregnancy test.  Nurse advised she check with her OB for medication safety recommendations.  She will do so.

## 2025-03-07 RX ORDER — NORTRIPTYLINE HYDROCHLORIDE 25 MG/1
25 CAPSULE ORAL NIGHTLY
Qty: 90 CAPSULE | Refills: 3 | Status: SHIPPED | OUTPATIENT
Start: 2025-03-07

## 2025-03-07 NOTE — TELEPHONE ENCOUNTER
Please review. Protocol Failed; No Protocol    Future Appointments   Date Time Provider Department Center   3/29/2025 10:00 AM  OB EDUCATION ROOM [facility]   4/3/2025  4:00 PM Christa Ellis MD [facility]

## 2025-03-08 RX ORDER — VALACYCLOVIR HYDROCHLORIDE 500 MG/1
500 TABLET, FILM COATED ORAL DAILY
Qty: 90 TABLET | Refills: 1 | Status: SHIPPED | OUTPATIENT
Start: 2025-03-08

## 2025-03-08 NOTE — TELEPHONE ENCOUNTER
Patient notified with understanding. She will take daily for a few weeks and follow up with the office.

## 2025-03-08 NOTE — TELEPHONE ENCOUNTER
Would recommend daily 500mg dosing for suppressive therapy due to recurrent lesions.  This is safe for pregnancy.

## 2025-03-14 ENCOUNTER — TELEPHONE (OUTPATIENT)
Dept: OBGYN CLINIC | Facility: CLINIC | Age: 35
End: 2025-03-14

## 2025-03-14 ENCOUNTER — LAB ENCOUNTER (OUTPATIENT)
Dept: LAB | Facility: HOSPITAL | Age: 35
End: 2025-03-14
Attending: OBSTETRICS & GYNECOLOGY
Payer: COMMERCIAL

## 2025-03-14 DIAGNOSIS — O20.9 BLEEDING IN EARLY PREGNANCY (HCC): ICD-10-CM

## 2025-03-14 DIAGNOSIS — O20.9 BLEEDING IN EARLY PREGNANCY (HCC): Primary | ICD-10-CM

## 2025-03-14 LAB
ANTIBODY SCREEN: NEGATIVE
B-HCG SERPL-ACNC: 5378.5 MIU/ML
RH BLOOD TYPE: POSITIVE

## 2025-03-14 PROCEDURE — 84702 CHORIONIC GONADOTROPIN TEST: CPT

## 2025-03-14 PROCEDURE — 36415 COLL VENOUS BLD VENIPUNCTURE: CPT

## 2025-03-14 PROCEDURE — 86900 BLOOD TYPING SEROLOGIC ABO: CPT | Performed by: OBSTETRICS & GYNECOLOGY

## 2025-03-14 PROCEDURE — 86901 BLOOD TYPING SEROLOGIC RH(D): CPT | Performed by: OBSTETRICS & GYNECOLOGY

## 2025-03-14 PROCEDURE — 86850 RBC ANTIBODY SCREEN: CPT | Performed by: OBSTETRICS & GYNECOLOGY

## 2025-03-14 NOTE — TELEPHONE ENCOUNTER
Leena called and informed of Dr. Reyes recommendations. She will go tonight to [facility] to have lab work completed tonight. Informed office will reach out tomorrow with results.

## 2025-03-14 NOTE — TELEPHONE ENCOUNTER
Leena's last menstrual period 2/3/2025 with 24-25 day cycles, making her 5w4d. She is calling today regarding bleeding she had after intercourse last night. She states bleeding was spotting of light pink after and she passed a clot the size for 1/2 a dime. She states discharge today is brown. No cramping.     We dicussed that spotting post intercourse is not uncommon d/t increased hormonal changes r/t pregnancy. Recommendations to monitor spotting, should resolve within the next 24 hours. She is to call if spotting returns, cramping or clots.     MBT is not on-file. She states she believes she is A+ d/t what is listed on her birth certification. Informed will discuss with Dr. Reyes if need for ABO/RH at this time. She states understanding.     To Dr. Reyes on-call, please review and advise. Thank you.

## 2025-03-15 NOTE — TELEPHONE ENCOUNTER
Component      Latest Ref Rng 3/14/2025   HCG QUANTITATIVE      <=4.2 mIU/mL 5,378.5 (H)       A positive blood type.   Called patient and informed of results and does not need Rhogam. Advised patient to proceed with plan to repeat quant. Patient will go Monday morning or afternoon.

## 2025-03-16 ENCOUNTER — LAB ENCOUNTER (OUTPATIENT)
Dept: LAB | Facility: HOSPITAL | Age: 35
End: 2025-03-16
Attending: OBSTETRICS & GYNECOLOGY
Payer: COMMERCIAL

## 2025-03-16 DIAGNOSIS — O20.9 BLEEDING IN EARLY PREGNANCY (HCC): ICD-10-CM

## 2025-03-16 LAB — B-HCG SERPL-ACNC: 9518.9 MIU/ML

## 2025-03-16 PROCEDURE — 84702 CHORIONIC GONADOTROPIN TEST: CPT

## 2025-03-16 PROCEDURE — 36415 COLL VENOUS BLD VENIPUNCTURE: CPT

## 2025-03-17 ENCOUNTER — HOSPITAL ENCOUNTER (OUTPATIENT)
Dept: ULTRASOUND IMAGING | Facility: HOSPITAL | Age: 35
Discharge: HOME | End: 2025-03-17
Attending: OBSTETRICS & GYNECOLOGY
Payer: COMMERCIAL

## 2025-03-17 DIAGNOSIS — O20.9 BLEEDING IN EARLY PREGNANCY (HCC): ICD-10-CM

## 2025-03-17 PROCEDURE — 76817 TRANSVAGINAL US OBSTETRIC: CPT | Performed by: OBSTETRICS & GYNECOLOGY

## 2025-03-17 PROCEDURE — 76801 OB US < 14 WKS SINGLE FETUS: CPT | Performed by: OBSTETRICS & GYNECOLOGY

## 2025-03-17 NOTE — TELEPHONE ENCOUNTER
Component      Latest Ref Rng 3/14/2025 3/16/2025   HCG QUANTITATIVE      <=4.2 mIU/mL 5,378.5 (H)  9,518.9 (H)       Leena dating 6w by 2/3 last menstrual period.  She is following up on quants ordered 3/14 for pink spotting with intercourse.   Quants were not done 48 hours apart. 3/14 quant done at 7:09 PM, 3/16 quant done 10:24 am.     Called back for status update then will review with on-call.  She state spotting completely resolved since she spoke to us on 3/15.  +pelvic cramping over the weekend rated 5/10 that comes and goes.   She states cramping is \"all over\" the pelvis, but sometimes gets sharp pains off to left or right. She does note that cramping seems stronger on right side compared to left.   She admits to constipation d/t iron in her prenatal vitamin.  Constipation recommendations reviewed.     To Dr. Love to advise.

## 2025-03-17 NOTE — TELEPHONE ENCOUNTER
RIVERA from Dr. Love for hold & call.    Siobhan at ultrasound able to add at 4pm at [facility].   Reviewed fluid prep and time/date/location.  Patient verbalized understanding.

## 2025-03-29 ENCOUNTER — TELEPHONE (OUTPATIENT)
Dept: OBGYN CLINIC | Facility: CLINIC | Age: 35
End: 2025-03-29

## 2025-03-29 ENCOUNTER — NURSE ONLY (OUTPATIENT)
Dept: OBGYN CLINIC | Facility: CLINIC | Age: 35
End: 2025-03-29
Payer: COMMERCIAL

## 2025-03-29 VITALS — BODY MASS INDEX: 33.97 KG/M2 | WEIGHT: 199 LBS | HEIGHT: 64 IN

## 2025-03-29 DIAGNOSIS — Z34.01 ENCOUNTER FOR SUPERVISION OF NORMAL FIRST PREGNANCY IN FIRST TRIMESTER (HCC): Primary | ICD-10-CM

## 2025-03-29 RX ORDER — DIPHENHYDRAMINE HYDROCHLORIDE 25 MG/1
25 CAPSULE ORAL DAILY
COMMUNITY

## 2025-03-29 RX ORDER — CHOLECALCIFEROL (VITAMIN D3) 25 MCG
1 TABLET,CHEWABLE ORAL DAILY
COMMUNITY

## 2025-03-29 RX ORDER — METOCLOPRAMIDE 10 MG/1
10 TABLET ORAL EVERY 6 HOURS PRN
Qty: 30 TABLET | Refills: 1 | Status: SHIPPED | OUTPATIENT
Start: 2025-03-29 | End: 2025-04-21 | Stop reason: ALTCHOICE

## 2025-03-29 NOTE — PROGRESS NOTES
Pt seen for OBN PC appt today c/o all day nausea. She has been taking vitamin B6 and Unisom for about a week with not much relief. Will message on call for prescription anti emetic. Last menstrual period 2/3 with regular monthly periods. Patient had hcg x 2 and a viability ultrasound for spotting. Ultrasound 3/17 showed a viable IUP measuring 6w0d, estimated delivery date 11/10/25.     Normal PN labs plus 1 hour GTT ordered. Pt advised all labs must be completed and resulted prior to NPN appt. If labs are not completed and resulted the NPN appt will be cancelled. Pt informed again of both male and female providers and the need to rotate PN appt with all providers since OB on-call will be the one that delivers her. Assisted pt with scheduling NPN appt with MD.     Patient currently taking Nortriptyline daily for migraines. Cat D. Will discontinue and reach out to PCP for alternatives.      Height: 5'4\"  Weight: 199.0lb  BMI: 34    Partner's name is Davion Adams contact #[phone]; race: white  Occupation:  at [employer]     MEDICAL HISTORY    Anemia No    Anesthetic complications No    Anxiety/Depression  Yes  Anxiety, doing ok right now per pt   Autoimmune Disorder No    Asthma  Yes    Cancer No    Diabetes  No    Gyne/breast Surgery No    Heart Disease No    Hepatitis/Liver Disease  No    History of blood transfusion No    History of abnormal pap No    Hypertension  No    Infertility  No    Kidney Disease/Frequent UTIs  No    Medication Allergies No    Latex Allergies No    Food Allergies  No    Neurological Disorder/Epilepsy No    Operations/Hospitalizations No    TB exposure  No    Thyroid Dysfunction No    Trauma/Violence  No    Uterine Anomaly  No    Uterine Fibroids  No    Variocosities/DVTs No    Smoker No    Drug usage in prior year Yes  Cannabis 2 x last year    Alcohol No    Would you accept a blood transfusion? If no, are you a [employer]? Yes              INFECTION  HISTORY    Chlamydia No    Pt or partner have hx of Genital Herpes No    Gonorrhea No    Hepatitis B No    HIV No    HPV No    MRSA No    Syphilis No    Tattoos Yes     Live with someone or Exposed to TB No    Rash or viral illness since LMP  No    Varicella Yes Childhood    Pets No        GENETICS SCREENING    Genetic Screening    Genetic Screening/Teratology Counseling- Includes patient, baby's father, or anyone in either family with:  Patient's age 35 years or older as of estimated date of delivery: Yes     Thalassemia (Italian, Greek, Mediterranean, or  background): MCV less than 80: No     Neural tube defect (Meningomyelocele, Spina bifida, or Anencephaly): No     Congenital heart defect: No     Down syndrome: No     Jn-Sachs (Ashkenazi Spiritism, Cajun, Sami Cameroonian): No     Canavan disease (Ashkenazi Spiritism): No     Familial dysautonomia (Ashkenazi Spiritism): No     Sickle cell disease or trait (): No     Hemophilia or other blood disorders: No     Muscular dystrophy: No    Cystic fibrosis: No     Harding's chorea: No     Intellectual disability and/or autism: No     Other inherited genetic or chromosomal disorder: No     Maternal metabolic disorder (eg. Type 1 diabetes, PKU): No     Patient or baby's father had child with birth defects not listed above: No     Recurrent pregnancy loss, or a stillbirth: No     Medications (including supplements, vitamins, herbs, or OTC drugs)/illicit/recreational drugs/alcohol since last menstrual period: No                 MISC    Infant vaccinations  Yes

## 2025-03-29 NOTE — TELEPHONE ENCOUNTER
Spoke with patient for OB Nurse Education appointment. Patient is 7w5d. She c/o nausea throughout the day. She is not vomiting but she feels miserable all day. She has been taking B6 and Unisom for about a week with not much relief. She is able to tolerate fluids and knows to call if that changes. To Dr. Fuentes on call, can we send an prescription anti emetic for her to try? Thanks.

## 2025-04-03 ENCOUNTER — LAB ENCOUNTER (OUTPATIENT)
Dept: LAB | Facility: HOSPITAL | Age: 35
End: 2025-04-03
Attending: OBSTETRICS & GYNECOLOGY
Payer: COMMERCIAL

## 2025-04-03 ENCOUNTER — OFFICE VISIT (OUTPATIENT)
Dept: FAMILY MEDICINE CLINIC | Facility: CLINIC | Age: 35
End: 2025-04-03
Payer: COMMERCIAL

## 2025-04-03 ENCOUNTER — TELEPHONE (OUTPATIENT)
Dept: OBGYN CLINIC | Facility: CLINIC | Age: 35
End: 2025-04-03

## 2025-04-03 VITALS
HEART RATE: 99 BPM | HEIGHT: 64 IN | DIASTOLIC BLOOD PRESSURE: 69 MMHG | SYSTOLIC BLOOD PRESSURE: 91 MMHG | OXYGEN SATURATION: 99 % | WEIGHT: 217 LBS | TEMPERATURE: 98 F | BODY MASS INDEX: 37.05 KG/M2

## 2025-04-03 DIAGNOSIS — Z00.00 WELL ADULT EXAM: ICD-10-CM

## 2025-04-03 DIAGNOSIS — Z34.01 ENCOUNTER FOR SUPERVISION OF NORMAL FIRST PREGNANCY IN FIRST TRIMESTER (HCC): ICD-10-CM

## 2025-04-03 DIAGNOSIS — B07.9 VIRAL WART ON FINGER: ICD-10-CM

## 2025-04-03 DIAGNOSIS — O21.0 MORNING SICKNESS (HCC): ICD-10-CM

## 2025-04-03 DIAGNOSIS — Z00.00 WELL ADULT EXAM: Primary | ICD-10-CM

## 2025-04-03 DIAGNOSIS — R00.2 PALPITATIONS: ICD-10-CM

## 2025-04-03 DIAGNOSIS — Z3A.08 8 WEEKS GESTATION OF PREGNANCY (HCC): ICD-10-CM

## 2025-04-03 LAB
ANTIBODY SCREEN: POSITIVE
BASOPHILS # BLD AUTO: 0.05 X10(3) UL (ref 0–0.2)
BASOPHILS NFR BLD AUTO: 0.4 %
CHOLEST SERPL-MCNC: 202 MG/DL (ref ?–200)
DEPRECATED RDW RBC AUTO: 38.6 FL (ref 35.1–46.3)
DIRECT COOMBS POLY: NEGATIVE
EOSINOPHIL # BLD AUTO: 0.18 X10(3) UL (ref 0–0.7)
EOSINOPHIL NFR BLD AUTO: 1.5 %
ERYTHROCYTE [DISTWIDTH] IN BLOOD BY AUTOMATED COUNT: 13.5 % (ref 11–15)
EST. AVERAGE GLUCOSE BLD GHB EST-MCNC: 117 MG/DL (ref 68–126)
FASTING PATIENT LIPID ANSWER: NO
GLUCOSE 1H P GLC SERPL-MCNC: 183 MG/DL
HBA1C MFR BLD: 5.7 % (ref ?–5.7)
HBV SURFACE AG SER-ACNC: 0.15 [IU]/L
HBV SURFACE AG SERPL QL IA: NONREACTIVE
HCT VFR BLD AUTO: 33.7 %
HCV AB SERPL QL IA: NONREACTIVE
HDLC SERPL-MCNC: 53 MG/DL (ref 40–59)
HGB BLD-MCNC: 11.5 G/DL
IMM GRANULOCYTES # BLD AUTO: 0.06 X10(3) UL (ref 0–1)
IMM GRANULOCYTES NFR BLD: 0.5 %
LDLC SERPL CALC-MCNC: 105 MG/DL (ref ?–100)
LYMPHOCYTES # BLD AUTO: 2.94 X10(3) UL (ref 1–4)
LYMPHOCYTES NFR BLD AUTO: 24.3 %
MCH RBC QN AUTO: 27.4 PG (ref 26–34)
MCHC RBC AUTO-ENTMCNC: 34.1 G/DL (ref 31–37)
MCV RBC AUTO: 80.2 FL
MONOCYTES # BLD AUTO: 0.73 X10(3) UL (ref 0.1–1)
MONOCYTES NFR BLD AUTO: 6 %
NEUTROPHILS # BLD AUTO: 8.14 X10 (3) UL (ref 1.5–7.7)
NEUTROPHILS # BLD AUTO: 8.14 X10(3) UL (ref 1.5–7.7)
NEUTROPHILS NFR BLD AUTO: 67.3 %
NONHDLC SERPL-MCNC: 149 MG/DL (ref ?–130)
PLATELET # BLD AUTO: 316 10(3)UL (ref 150–450)
RBC # BLD AUTO: 4.2 X10(6)UL
RH BLOOD TYPE: POSITIVE
RUBV IGG SER QL: POSITIVE
RUBV IGG SER-ACNC: 179 IU/ML (ref 10–?)
T PALLIDUM AB SER QL IA: NONREACTIVE
TRIGL SERPL-MCNC: 258 MG/DL (ref 30–149)
TSI SER-ACNC: 1.77 UIU/ML (ref 0.55–4.78)
VIT D+METAB SERPL-MCNC: 13.3 NG/ML (ref 30–100)
VLDLC SERPL CALC-MCNC: 44 MG/DL (ref 0–30)
WBC # BLD AUTO: 12.1 X10(3) UL (ref 4–11)

## 2025-04-03 PROCEDURE — 36415 COLL VENOUS BLD VENIPUNCTURE: CPT

## 2025-04-03 PROCEDURE — 86870 RBC ANTIBODY IDENTIFICATION: CPT

## 2025-04-03 PROCEDURE — 86900 BLOOD TYPING SEROLOGIC ABO: CPT

## 2025-04-03 PROCEDURE — 83036 HEMOGLOBIN GLYCOSYLATED A1C: CPT

## 2025-04-03 PROCEDURE — 86803 HEPATITIS C AB TEST: CPT

## 2025-04-03 PROCEDURE — 82306 VITAMIN D 25 HYDROXY: CPT

## 2025-04-03 PROCEDURE — 86077 PHYS BLOOD BANK SERV XMATCH: CPT

## 2025-04-03 PROCEDURE — 86901 BLOOD TYPING SEROLOGIC RH(D): CPT

## 2025-04-03 PROCEDURE — 86762 RUBELLA ANTIBODY: CPT

## 2025-04-03 PROCEDURE — 87086 URINE CULTURE/COLONY COUNT: CPT

## 2025-04-03 PROCEDURE — 82950 GLUCOSE TEST: CPT

## 2025-04-03 PROCEDURE — 85025 COMPLETE CBC W/AUTO DIFF WBC: CPT

## 2025-04-03 PROCEDURE — 86850 RBC ANTIBODY SCREEN: CPT

## 2025-04-03 PROCEDURE — 84443 ASSAY THYROID STIM HORMONE: CPT

## 2025-04-03 PROCEDURE — 87389 HIV-1 AG W/HIV-1&-2 AB AG IA: CPT

## 2025-04-03 PROCEDURE — 86880 COOMBS TEST DIRECT: CPT

## 2025-04-03 PROCEDURE — 80061 LIPID PANEL: CPT

## 2025-04-03 PROCEDURE — 86780 TREPONEMA PALLIDUM: CPT

## 2025-04-03 PROCEDURE — 87340 HEPATITIS B SURFACE AG IA: CPT

## 2025-04-03 RX ORDER — ONDANSETRON 8 MG/1
8 TABLET, FILM COATED ORAL EVERY 8 HOURS PRN
Qty: 90 TABLET | Refills: 1 | Status: SHIPPED | OUTPATIENT
Start: 2025-04-03

## 2025-04-03 NOTE — PROGRESS NOTES
HPI:    Patient ID: Leena Norman is a 34 year old female.    HPI  Pt presenting for routine physical exam. Denies any acute issues or recent illnesses. No significant chronic medical problems. Past medical/surgical history, family history, and social history were reviewed.     Currently 8wks pregnant  Reports consistent nausea  Reglan with inconsistent relief    Reports episodes of palpitations when lying on Left  Onset prepregnancy  Improved with propranolol dosing    H/o migraines  Unclear about amitriptyline vs nortriptyline pregnancy safety  Admits to better symptoms control since pregnant    Trouble with focusing prepregnancy  Mag, L-tyrosine without improvement  Not stress related  Mood stable, denies SH/SI/HI    Review of Systems   A comprehensive 10 point review of systems was completed.  Pertinent positives and negatives noted in the the HPI.       Current Outpatient Medications   Medication Sig Dispense Refill    ondansetron (ZOFRAN) 8 MG tablet Take 1 tablet (8 mg total) by mouth every 8 (eight) hours as needed for Nausea. 90 tablet 1    prenatal vitamin with DHA 27-0.8-228 MG Oral Cap Take 1 capsule by mouth daily.      metoclopramide (REGLAN) 10 MG Oral Tab Take 1 tablet (10 mg total) by mouth every 6 (six) hours as needed. 30 tablet 1    valACYclovir 500 MG Oral Tab Take 1 tablet (500 mg total) by mouth daily. 90 tablet 1    Propranolol HCl ER 60 MG Oral Capsule SR 24 Hr Take 1 capsule (60 mg total) by mouth daily. 90 capsule 3    albuterol (PROAIR HFA) 108 (90 Base) MCG/ACT Inhalation Aero Soln Inhale 2 puffs into the lungs every 4 (four) hours as needed for Wheezing or Shortness of Breath. 8.5 each 2    Doxylamine Succinate, Sleep, (UNISOM OR) Take 1 tablet by mouth at bedtime. (Patient not taking: Reported on 4/3/2025)      Pyridoxine HCl (VITAMIN B-6) 25 MG Oral Tab Take 1 tablet (25 mg total) by mouth daily. (Patient not taking: Reported on 4/3/2025)      nortriptyline 25 MG Oral Cap Take 1  capsule (25 mg total) by mouth nightly. Along with 1 capsule (10mg) to equal daily dose of 35mg. (Patient not taking: Reported on 4/3/2025) 90 capsule 3    ALPRAZolam 0.25 MG Oral Tab Take 1 tablet (0.25 mg total) by mouth nightly as needed for Anxiety. (Patient not taking: Reported on 4/3/2025) 30 tablet 0    nortriptyline 10 MG Oral Cap Take 3 capsules (30 mg total) by mouth nightly. (Patient not taking: Reported on 4/3/2025)      phenazopyridine 200 MG Oral Tab Take 1 tablet (200 mg total) by mouth 3 (three) times daily as needed for Pain. (Patient not taking: Reported on 4/3/2025) 15 tablet 0    oxybutynin 5 MG Oral Tab Take 1 tablet (5 mg total) by mouth 3 (three) times daily as needed (bladder spasm). (Patient not taking: Reported on 4/3/2025) 15 tablet 0    methocarbamol 750 MG Oral Tab Take 1 tablet (750 mg total) by mouth 4 (four) times daily as needed (pain). (Patient not taking: Reported on 4/3/2025) 60 tablet 3     Allergies:Allergies[1]   Vitals:    04/03/25 1559   BP: 91/69   Pulse: 99   Temp: 98.1 °F (36.7 °C)   SpO2: 99%   Weight: 217 lb (98.4 kg)   Height: 5' 4\" (1.626 m)       Body mass index is 37.25 kg/m².   PHYSICAL EXAM:   Physical Exam  Vitals reviewed.   Constitutional:       General: She is not in acute distress.     Appearance: Normal appearance. She is well-developed.   HENT:      Head: Normocephalic and atraumatic.      Right Ear: Tympanic membrane, ear canal and external ear normal.      Left Ear: Tympanic membrane, ear canal and external ear normal.   Eyes:      Conjunctiva/sclera: Conjunctivae normal.   Neck:      Thyroid: No thyroid mass or thyroid tenderness.   Cardiovascular:      Rate and Rhythm: Normal rate and regular rhythm.      Pulses: Normal pulses.      Heart sounds: Normal heart sounds, S1 normal and S2 normal. No murmur heard.  Pulmonary:      Effort: Pulmonary effort is normal. No respiratory distress.      Breath sounds: Normal breath sounds. No wheezing, rhonchi or  rales.   Abdominal:      General: Bowel sounds are normal.      Palpations: Abdomen is soft.      Tenderness: There is no abdominal tenderness. There is no guarding or rebound.   Musculoskeletal:      Cervical back: Normal range of motion and neck supple. No muscular tenderness.      Right lower leg: No edema.      Left lower leg: No edema.   Lymphadenopathy:      Cervical: No cervical adenopathy.   Skin:     General: Skin is warm and dry.      Coloration: Skin is not jaundiced.      Comments: Wart on left 2nd digit   Neurological:      General: No focal deficit present.      Mental Status: She is alert and oriented to person, place, and time. Mental status is at baseline.   Psychiatric:         Attention and Perception: Attention normal.         Mood and Affect: Mood normal.         Behavior: Behavior normal. Behavior is cooperative.         Cognition and Memory: Cognition normal.             ASSESSMENT/PLAN:   1. Well adult exam  Discussed preventative screenings  - Pap 12/2022  - will check labs as below  - encouraged to continue diet/exercise for overall wellness  - follow-up with eye doctor annually  - follow-up with dentist every 6 months  - return yearly for physicals  - annual flu shot  - tetanus booster every 10yrs -- will anticipate 3rd tri dosing  - Lipid Panel; Future  - TSH W Reflex To Free T4 [E]; Future  - Hemoglobin A1C; Future  - Vitamin D [E]; Future    2. 8 weeks gestation of pregnancy (HCC)  - continue prenatal MVI  - increase hydration and rest as tolerated  - discussed red flags for urgent reevaluation  - to call with any questions/concerns    3. Morning sickness (HCC)  Will start Zofran as adjunct  - discussed red flags for urgent reevaluation  - ondansetron (ZOFRAN) 8 MG tablet; Take 1 tablet (8 mg total) by mouth every 8 (eight) hours as needed for Nausea.  Dispense: 90 tablet; Refill: 1    4. Palpitations  Unclear etiology  Will check TTE  - CARD ECHO 2D DOPPLER (CPT=93306); Future    5.  Viral wart on finger  Discussed treatment options -- pt requesting CryoTx  Area was cleaned with alcohol wipe. Using liquid nitrogen gun, pt underwent 3 freeze-thaw cycles without complication. Counseled on continued hygiene and red flags for reevaluation.    Pt verbalized understanding and agrees with plan.      Orders Placed This Encounter   Procedures    Lipid Panel    TSH W Reflex To Free T4 [E]    Hemoglobin A1C    Vitamin D [E]       Meds This Visit:  Requested Prescriptions     Signed Prescriptions Disp Refills    ondansetron (ZOFRAN) 8 MG tablet 90 tablet 1     Sig: Take 1 tablet (8 mg total) by mouth every 8 (eight) hours as needed for Nausea.       Imaging & Referrals:  CARD ECHO 2D DOPPLER (CPT=93306)         ID#[id number]         [1] No Known Allergies

## 2025-04-03 NOTE — TELEPHONE ENCOUNTER
8w3d.  Patient was taking reglan for nausea but states it wasn't working well.   She has her physical with her pcp today and her pcp prescribed zofran 8mg, ever 8 hours prn.  Patient asking if this is safe.  Patient informed we prescribe zofran for nausea as well.  Message to on call for any further recommendations.

## 2025-04-04 ENCOUNTER — TELEPHONE (OUTPATIENT)
Dept: OBGYN CLINIC | Facility: CLINIC | Age: 35
End: 2025-04-04

## 2025-04-04 DIAGNOSIS — E55.9 VITAMIN D DEFICIENCY: Primary | ICD-10-CM

## 2025-04-04 RX ORDER — ERGOCALCIFEROL 1.25 MG/1
50000 CAPSULE, LIQUID FILLED ORAL WEEKLY
Qty: 24 CAPSULE | Refills: 0 | Status: SHIPPED | OUTPATIENT
Start: 2025-04-04

## 2025-04-04 NOTE — TELEPHONE ENCOUNTER
8w4d. Patient with c/o spotting with intercourse in early pregnancy (see 3/14 telephone encounter). See quants and ultrasound report below:    Component      Latest Ref Rng 3/14/2025 3/16/2025   HCG QUANTITATIVE      <=4.2 mIU/mL 5,378.5 (H)  9,518.9 (H)      3/17/25:  Impression   CONCLUSION:  1. Single live intrauterine gestation with crown-rump length corresponding to an estimated gestational age of 6 weeks 0 days +/-4 days and SYLVIA of November 10, 2025.  2. Small subchorionic hemorrhage.     Patient's blood type A+. Patient with known subchorionic hematoma.    Today patient had intercourse x2.     1st time having intercourse- no spotting.    2nd time having intercourse (4 hours later) - directly afterwards, patient and  both noticed a light amount of bright red blood on them. Patient used the restroom shortly thereafter and noticed additional bright red blood with wiping.    Denies clots, pain or cramping today.    Provided ER bleeding and pain recommendations to patient. Advised to call back should she experience any additional episodes of bleeding or spotting. Patient verbalized understanding. Informed patient that bleeding may be r/t recent intercourse or subchorionic hematoma.    To Dr. John for any additional recommendations.

## 2025-04-07 ENCOUNTER — TELEPHONE (OUTPATIENT)
Dept: OBGYN CLINIC | Facility: CLINIC | Age: 35
End: 2025-04-07

## 2025-04-07 RX ORDER — ONDANSETRON 4 MG/1
4 TABLET, ORALLY DISINTEGRATING ORAL EVERY 8 HOURS PRN
Qty: 10 TABLET | Refills: 0 | Status: SHIPPED | OUTPATIENT
Start: 2025-04-07

## 2025-04-07 NOTE — TELEPHONE ENCOUNTER
Patient states zofran that was prescribed is not helping. Patient asking if something else can be prescribed?       Patient continues to have nausea and vomiting.

## 2025-04-07 NOTE — TELEPHONE ENCOUNTER
Would not recommend having PCP also ordering meds while pregnant. Needs to try vit B6 / unisom regimen at same time. Can send zofran 4 mg ODT #10 to use only if severe Sx.

## 2025-04-07 NOTE — TELEPHONE ENCOUNTER
9w0d. Patient calling to report that she is still having nausea and dry heaving despite anti-emetics. She states Reglan helped only for the first 2 days, so she asked her PCP at her physical for Zofran. Patient states she is taking both Zofran and Reglan at the same time q 8 hrs, with not much relief. Patient is wondering if the sublingual would be more effective then the oral (she is taking oral Zofran).     She is tolerating fluids, but states when she tries to eat she gags. Patient has been snacking throughout the day, as opposed to eating full meals, and is using ginger products. She denies any vomiting.     To Dr. Amos (on-call) to please review and advise for further recommendations.

## 2025-04-07 NOTE — TELEPHONE ENCOUNTER
Leena notified prescription sent. Reviewed message per Dr. Amos. Advised to continue B6/Unisom at same time.

## 2025-04-11 ENCOUNTER — INITIAL PRENATAL (OUTPATIENT)
Dept: OBGYN CLINIC | Facility: CLINIC | Age: 35
End: 2025-04-11
Payer: COMMERCIAL

## 2025-04-11 VITALS
HEART RATE: 94 BPM | SYSTOLIC BLOOD PRESSURE: 120 MMHG | BODY MASS INDEX: 37 KG/M2 | WEIGHT: 216 LBS | DIASTOLIC BLOOD PRESSURE: 78 MMHG

## 2025-04-11 DIAGNOSIS — Z34.01 ENCOUNTER FOR SUPERVISION OF NORMAL FIRST PREGNANCY IN FIRST TRIMESTER (HCC): Primary | ICD-10-CM

## 2025-04-11 PROBLEM — O09.529 AMA (ADVANCED MATERNAL AGE) MULTIGRAVIDA 35+ (HCC): Status: ACTIVE | Noted: 2025-04-11

## 2025-04-11 PROBLEM — Z86.79 H/O SINUS TACHYCARDIA: Status: ACTIVE | Noted: 2025-04-11

## 2025-04-11 LAB
APPEARANCE: CLEAR
GLUCOSE (URINE DIPSTICK): NEGATIVE MG/DL
KETONES (URINE DIPSTICK): NEGATIVE MG/DL
MULTISTIX LOT#: NORMAL NUMERIC
NITRITE, URINE: NEGATIVE
PROTEIN (URINE DIPSTICK): NEGATIVE MG/DL
URINE-COLOR: YELLOW

## 2025-04-11 PROCEDURE — 3078F DIAST BP <80 MM HG: CPT | Performed by: OBSTETRICS & GYNECOLOGY

## 2025-04-11 PROCEDURE — 81002 URINALYSIS NONAUTO W/O SCOPE: CPT | Performed by: OBSTETRICS & GYNECOLOGY

## 2025-04-11 PROCEDURE — 3074F SYST BP LT 130 MM HG: CPT | Performed by: OBSTETRICS & GYNECOLOGY

## 2025-04-11 RX ORDER — PROCHLORPERAZINE MALEATE 10 MG
10 TABLET ORAL EVERY 8 HOURS PRN
Qty: 20 TABLET | Refills: 0 | Status: SHIPPED | OUTPATIENT
Start: 2025-04-11

## 2025-04-11 RX ORDER — ONDANSETRON 4 MG/1
4 TABLET, ORALLY DISINTEGRATING ORAL EVERY 8 HOURS PRN
Qty: 10 TABLET | Refills: 0 | OUTPATIENT
Start: 2025-04-11

## 2025-04-11 NOTE — PROGRESS NOTES
C/o severe nausea and retching- zofran and reglan not helping.  Sent erx for compazine. Will hold zofran and reglan while trying this.  Patient had 2 episodes of uncontrollable retching during the visit today.  Wants FTS

## 2025-04-14 ENCOUNTER — TELEPHONE (OUTPATIENT)
Dept: OBGYN CLINIC | Facility: CLINIC | Age: 35
End: 2025-04-14

## 2025-04-14 DIAGNOSIS — Z36.9 FIRST TRIMESTER SCREENING (HCC): Primary | ICD-10-CM

## 2025-04-14 LAB
C TRACH DNA SPEC QL NAA+PROBE: NEGATIVE
N GONORRHOEA DNA SPEC QL NAA+PROBE: NEGATIVE
T VAGINALIS RRNA SPEC QL NAA+PROBE: NEGATIVE

## 2025-04-14 NOTE — TELEPHONE ENCOUNTER
Noted. Is it OK for pt to continue the compazine? She stated she wanted to give it more time to work but wasn't sure if she should continue with it because of the heart palpations.

## 2025-04-14 NOTE — TELEPHONE ENCOUNTER
No other options if using reglan, zofran and compazine.  Wouldn't recommend steroids this early in pregnancy.  Agree with PCP surveillance and cardiac work up if having palpitations

## 2025-04-14 NOTE — TELEPHONE ENCOUNTER
Pt is 10w0d and has been struggling with n/v this pregnancy. Pt has already tried vitamin B6/unisom, reglan, and zofran with no relief. Pt saw [facility] on 4/11 and was given a prescription for oral compazine. Pt stated that since starting the compazine on Friday, she has noticed an increase in heart palpitations. Pt stated that she only notices the heart palps at night and states it lasts about 5 seconds, stops, then returns about 10 minutes later for about 5 seconds again. Pt reports after those 2 episodes, she does not notice heart palps the rest of the night. Pt stated that she is typically laying on her back, tilted to the left. Pt stated that she has discussed heart palps with her PCP and an echo was ordered for her which pt still needs to schedule.     Pt stated that her nausea/vomiting has slightly improved with the compazine but it is not 100%. States that she still has some nausea and dry heaving. Pt asking if she should give the compazine more time to work or if she should stop due to the heart palpitations. Pt asking if she needs to stop the compainze, what other options does she have for an antiemetic? Message to NATALIYA (on call).       Pt also interested in FTS . message to nadia.

## 2025-04-14 NOTE — TELEPHONE ENCOUNTER
Its probably more hydration status than the meds.  Make sure she is using electrolytes with her water

## 2025-04-21 ENCOUNTER — TELEPHONE (OUTPATIENT)
Dept: OBGYN CLINIC | Facility: CLINIC | Age: 35
End: 2025-04-21

## 2025-04-21 DIAGNOSIS — O21.9 NAUSEA AND VOMITING IN PREGNANCY (HCC): Primary | ICD-10-CM

## 2025-04-21 RX ORDER — PROMETHAZINE HYDROCHLORIDE 12.5 MG/1
12.5 TABLET ORAL EVERY 6 HOURS PRN
Qty: 30 TABLET | Refills: 0 | Status: SHIPPED | OUTPATIENT
Start: 2025-04-21

## 2025-04-21 NOTE — TELEPHONE ENCOUNTER
11w0d. Leena calling with dry heaving up to 20 times a day, sometimes more. She was alternating Reglan and Zofran, which did not work. She was placed on Compazine 10 mg q8hr for the nausea by Dr. Love, she has been on for a week and it is also not working.     She is able to eat and hydrate w/o issue. She states food stays down, but then dry heaving happens and only mucus comes up. She had an episode on the phone with RN. She states she is at the point that she is exhausted, it is interfering with daily life and sleep. Informed will address with Dr. Love on-call.     To Dr. Love, please advise. Thank you.

## 2025-04-21 NOTE — TELEPHONE ENCOUNTER
Spoke to Dr. Love on-call, recommendations for Phenergan 12.5 mg every q6hr as needed. Have her also do Vit B6/Unisom protocol. Order placed for [facility] on file.     Leena informed of new medication. She will stop the Compazine. She is still taking the Vit B6 4 times daily with 1/2 Unisom at night.

## 2025-04-21 NOTE — TELEPHONE ENCOUNTER
Patient called in is 11 weeks pregnant, patient states she is very nauecous, tries to throw up nothing is coming up.   Request for a nurse to call for guidance

## 2025-04-30 ENCOUNTER — TELEPHONE (OUTPATIENT)
Dept: OBGYN CLINIC | Facility: CLINIC | Age: 35
End: 2025-04-30

## 2025-04-30 DIAGNOSIS — O21.9 NAUSEA AND VOMITING IN PREGNANCY (HCC): ICD-10-CM

## 2025-04-30 RX ORDER — PROMETHAZINE HYDROCHLORIDE 12.5 MG/1
12.5 TABLET ORAL EVERY 6 HOURS PRN
Qty: 120 TABLET | Refills: 0 | Status: SHIPPED | OUTPATIENT
Start: 2025-04-30

## 2025-04-30 NOTE — TELEPHONE ENCOUNTER
12w2d Leena reaching out for refill on Phenergan 12.5 mg q6hr for severe dry heaving. She is also on the Vit B6/Unisom protocol. She failed Reglan, Zofran and Compazine. She is able to hydrate and eat w/o issue. But has constant dry heaving that brings up mucus.     To Dr. Amos, on-call to please review and advise. Thank you.

## 2025-04-30 NOTE — PROGRESS NOTES
Outpatient Maternal-Fetal Medicine Consultation    Dear Dr. Amos    Thank you for requesting ultrasound evaluation and maternal fetal medicine consultation on your patient Leena Norman.  As you are aware she is a 34 year old female  with a paulson pregnancy and an Estimated Date of Delivery: 11/10/25.  A maternal-fetal medicine consultation was requested secondary to advanced maternal age obesity.  Her prenatal records and labs were reviewed.    HISTORY  # 1 - Date: None, Sex: None, Weight: None, GA: None, Type: None, Apgar1: None, Apgar5: None, Living: None, Birth Comments: None      Past Medical History  The patient  has a past medical history of Allergic rhinitis, Anxiety, Asthma (), Esophageal reflux, Headache (), and Visual impairment.    Past Surgical History  The patient  has no past surgical history on file.    Family History  The patient She indicated that her mother is . She indicated that her father is alive. She indicated that both of her brothers are alive.      Medications:   Current Outpatient Medications:     Promethazine HCl 12.5 MG Oral Tab, Take 1 tablet (12.5 mg total) by mouth every 6 (six) hours as needed for Nausea., Disp: 120 tablet, Rfl: 0    prochlorperazine (COMPAZINE) 10 mg tablet, Take 1 tablet (10 mg total) by mouth every 8 (eight) hours as needed for Nausea., Disp: 20 tablet, Rfl: 0    ondansetron 4 MG Oral Tablet Dispersible, Take 1 tablet (4 mg total) by mouth every 8 (eight) hours as needed for Nausea., Disp: 10 tablet, Rfl: 0    ergocalciferol 1.25 MG (17740 UT) Oral Cap, Take 1 capsule (50,000 Units total) by mouth once a week., Disp: 24 capsule, Rfl: 0    ondansetron (ZOFRAN) 8 MG tablet, Take 1 tablet (8 mg total) by mouth every 8 (eight) hours as needed for Nausea., Disp: 90 tablet, Rfl: 1    prenatal vitamin with DHA 27-0.8-228 MG Oral Cap, Take 1 capsule by mouth daily., Disp: , Rfl:     Doxylamine Succinate, Sleep, (UNISOM OR), Take 1 tablet by  mouth at bedtime. (Patient not taking: Reported on 4/3/2025), Disp: , Rfl:     Pyridoxine HCl (VITAMIN B-6) 25 MG Oral Tab, Take 1 tablet (25 mg total) by mouth daily. (Patient not taking: Reported on 4/3/2025), Disp: , Rfl:     valACYclovir 500 MG Oral Tab, Take 1 tablet (500 mg total) by mouth daily., Disp: 90 tablet, Rfl: 1    nortriptyline 25 MG Oral Cap, Take 1 capsule (25 mg total) by mouth nightly. Along with 1 capsule (10mg) to equal daily dose of 35mg. (Patient not taking: Reported on 4/3/2025), Disp: 90 capsule, Rfl: 3    ALPRAZolam 0.25 MG Oral Tab, Take 1 tablet (0.25 mg total) by mouth nightly as needed for Anxiety. (Patient not taking: Reported on 4/3/2025), Disp: 30 tablet, Rfl: 0    nortriptyline 10 MG Oral Cap, Take 3 capsules (30 mg total) by mouth nightly. (Patient not taking: Reported on 4/3/2025), Disp: , Rfl:     phenazopyridine 200 MG Oral Tab, Take 1 tablet (200 mg total) by mouth 3 (three) times daily as needed for Pain. (Patient not taking: Reported on 4/3/2025), Disp: 15 tablet, Rfl: 0    oxybutynin 5 MG Oral Tab, Take 1 tablet (5 mg total) by mouth 3 (three) times daily as needed (bladder spasm). (Patient not taking: Reported on 4/3/2025), Disp: 15 tablet, Rfl: 0    Propranolol HCl ER 60 MG Oral Capsule SR 24 Hr, Take 1 capsule (60 mg total) by mouth daily., Disp: 90 capsule, Rfl: 3    methocarbamol 750 MG Oral Tab, Take 1 tablet (750 mg total) by mouth 4 (four) times daily as needed (pain). (Patient not taking: Reported on 4/3/2025), Disp: 60 tablet, Rfl: 3    albuterol (PROAIR HFA) 108 (90 Base) MCG/ACT Inhalation Aero Soln, Inhale 2 puffs into the lungs every 4 (four) hours as needed for Wheezing or Shortness of Breath., Disp: 8.5 each, Rfl: 2  Allergies: No Known Allergies    PHYSICAL EXAMINATION:  /78   Pulse 120   Wt 216 lb (98 kg)   LMP 02/03/2025 (Exact Date)   BMI 37.08 kg/m²   General: alert and oriented,no acute distress  Abdomen: gravid, soft,  non-tender  Extremities: non-tender, no edema    OBSTETRIC ULTRASOUND  The patient had a first trimester ultrasound today which revealed normal first trimester anatomy with size consistent with dates.     Single IUP with cardiac activity 170 bpm  Amniotic fluid is normal.  Placenta location is posterior  The CRL is consistent with gestational age. Nasal bone present. The nuchal translucency measures 1.8 mm. This is within normal limits.   The maternal uterus and ovaries appear normal.    See PACS/Imaging Tab For Complete Ultrasound Report    I interpreted the results and reviewed them with the patient.    DISCUSSION  During her visit we discussed and reviewed the following issues:    ADVANCED MATERNAL AGE    Background  I reviewed with the patient that pregnancies in women of advanced maternal age (35 or older at delivery) are associated with elevated risks.  Specifically, there is a higher rate of:    Fetal malformations  Preeclampsia  Gestational diabetes  Intrauterine fetal death    As a result, enhanced pregnancy surveillance is advised for these patients including a comprehensive ultrasound to assess for fetal malformations  (at 20 weeks) and a third trimester ultrasound assessment for fetal growth (at 32 weeks).  In addition, weekly NST's (initiating at 36 weeks gestation for women 35-39 years and at 32 weeks gestation for women 40 years and older) are also advised.  Routine obstetric care is more than adequate to assess for gestational diabetes and preeclampsia; hence, no further significant alterations in obstetric care are advised.    Fetal Aneuploidy    We also discussed the increased risk of chromosomal abnormalities associated with advanced maternal age.  I reviewed that an ultrasound examination cannot reliably exclude potential genetic abnormalities. Given that the patient will be 35 years old at the time of delivery I reviewed that her risk (at delivery) of having a  with any chromosome  abnormality is 1:156 and with trisomy 21 is 1: 289.    Invasive Testing    I offered invasive genetic testing (amniocentesis, chorionic villus sampling) after reviewing the diagnostic accuracy of these tests as well as the procedure associated loss rate (1:500 for genetic amniocentesis).    She ultimately does not desire invasive genetic testing.     Non-invasive Pregnancy Testing (NIPT)    I reviewed current non-invasive screening options.  Currently non-invasive pregnancy testing (NIPT) offers the highest detection rate (with the lowest false positive rate) for the detection of fetal aneuploidy amongst high-risk patients.  The limitations of detailed mid-trimester sonography was reviewed with the patient.  First trimester screening and second trimester multiple-marker serum serum screening as alternative aneuploidy screening options were also reviewed.  However, both of these tests are associated with lower detection and higher false positive rates.    The patient ultimately elected to obtain NIPT (cell-free fetal DNA assessment).  We expect the results to be available within 7 days; the patient will be informed when the results are available.    OBESITY  Obesity during pregnancy is associated with numerous maternal and  risks.  It is not clear whether obesity is a direct cause of adverse pregnancy outcome or whether the association between obesity and adverse pregnancy outcome is due to factors such as diabetes mellitus.   Data suggest that obese women should be encouraged to undertake a weight reduction program (diet, exercise, behavior modification, and possibly bariatric surgery in some cases) prior to attempting to conceive.            Subfertility in obese women is most commonly related to ovulatory dysfunction, and, in some obese women, the ovulatory dysfunction is related to polycystic ovary syndrome (PCOS). It is also important to note that even among ovulatory women, increasing obesity is  associated with decreasing spontaneous pregnancy rates.  The increased risk of miscarriage in obese women may be because such women often have PCOS or isolated insulin resistance.                 Due to its strong association with obesity in the general population, type 2 diabetes mellitus is one of the two most common medical complications of the obese . The increased risk of type 2 diabetes is primarily related to an exaggerated increase in insulin resistance in the obese state. It is reasonable to screen obese gravidas for undiagnosed pregestational diabetes in the first trimester.   Glucose intolerance associated with gestational diabetes generally resolves postpartum; however, obese women with a history of gestational diabetes have a two-fold increased prevalence of subsequent type 2 diabetes.           An association between obesity and hypertensive disorders during pregnancy has been consistently reported.  In particular, maternal weight and BMI are independent risk factors for preeclampsia.             Studies have found that the increased risk of  birth in obese gravidas is primarily associated with obesity-related medical and  complications, rather than an intrinsic predisposition to spontaneous  birth. Prevention of  birth in these patients, therefore, should be directed toward prevention or management of medical and obstetrical complications.               Both prepregnancy obesity and excessive maternal weight gain before or during pregnancy contribute to an increased probability of  delivery.  It has also been hypothesized that obesity may lead to dystocia due to increased soft tissue deposition in the maternal pelvis.    delivery in the obese  is associated with numerous perioperative concerns, including emergency delivery, prolonged incision to delivery interval, blood loss >1000 mL, longer operative times, wound infection, thromboembolism,  and endometritis.            Maternal obesity appears to be associated with a small increase in the absolute rate of some congenital anomalies, and the risk may increase with increasing maternal weight.  The risk of neural tube defects increased significantly with maternal weight.    The analysis found that overweight and obese pregnant women experienced significantly more stillbirths than normal weight women.      Increased  testing and Level 2 Ultrasound is recommended.       IMPRESSION:  IUP at 13w4d  AMA: obtained cell free fetal DNA  today, declined invasive testing  Obesity    RECOMMENDATIONS:  Continue care with Dr. Amos  Early 1 hr gtt  Level II ultrasound at 20 weeks  Growth US & BPP at 32 weeks  Weekly NSTs at 36 weeks  11-20 lb weight gain for pregnancy    Thank you for allowing me to participate in the care of your patient.  Please do not hesitate to contact me if additional questions or concerns arise.      Keshawn Cardona M.D.    40 minutes spent in review of records, patient consultation, documentation and coordination of care.  The relevant clinical matter(s) are summarized above.     Note to patient and family  The 21st Century Cures Act makes medical notes available to patients in the interest of transparency.  However, please be advised that this is a medical document.  It is intended as [id number] communication.  It is written and medical language may contain abbreviations or verbiage that are technical and unfamiliar.  It may appear blunt or direct.  Medical documents are intended to carry relevant information, facts as evident, and the clinical opinion of the practitioner.

## 2025-04-30 NOTE — TELEPHONE ENCOUNTER
Patient called to request refill. Promethazine HCl 12.5 MG Oral Tab. Please send [employer] message when order has been sent.

## 2025-05-01 ENCOUNTER — TELEPHONE (OUTPATIENT)
Dept: OBGYN CLINIC | Facility: CLINIC | Age: 35
End: 2025-05-01

## 2025-05-01 NOTE — TELEPHONE ENCOUNTER
Patient informed of Dr. Fuentes's response.  Patient states she will continue phenergan which helps with the nausea.  Patient advised to stay well hydrated to help thin out the mucus that is causing the retching.

## 2025-05-01 NOTE — TELEPHONE ENCOUNTER
12w3d Leena called to informed of need to complete 3 hr gtt d/t abnormal 1 hour gtt. She was given fasting and scheduling instructions.     While on the phone she has mention that she has spotting after intercourse twice so far. First time was spotting only right after that resolved quickly. She had intercourse 2 nights ago. Light pink spotting x24 hrs and now she is having brown flecks on the tissue paper with wiping post voiding. Both times she has had some mild cramping that goes away within an hour. This round of spotting she did pass 2 clots the size of her pinky nail bed. She also is having daily BM's, does have to push a bit to expel, but would not say she is straining. Address spotting c/b r/t intercourse. We did discuss bleeding and pain precautions. She states understanding.     She did have severe nausea, failed Reglan, Zofran and Compazine. Phenergan has helped. She is able to hydrate and eat w/o issue. But has constant dry heaving that brings up mucus. She states it feels like she has \"something in the back of my throat\".  She feels like it is getting worse and frequent. Informed will route message to [facility] to see if any recommendations on helping with the gagging.     To Dr. Fuentes on-call, please review and advise. Thank you,

## 2025-05-05 ENCOUNTER — TELEPHONE (OUTPATIENT)
Dept: OBGYN CLINIC | Facility: CLINIC | Age: 35
End: 2025-05-05

## 2025-05-05 NOTE — TELEPHONE ENCOUNTER
Patient is 13w0d, reports swelling to bilateral feet that happened during a 3 hr flight. Reports this morning swelling was significantly improved and now noticed she has swelling to bilateral feet again. Denies redness and pain. Also reports 2 days ago she had vomiting and diarrhea due to food she ate and patient pushed liquids and drank Body [employer] Electrolyte drink. Patient is at work now with sitting mostly and occasional standing. Advised patient it is common for fluid retention during pregnancy with travel and also the electrolyte drinks can be high in sodium. Advised to push water intake and limit sodium. Advised can also wear compression socks and avoid prolong sitting and standing.

## 2025-05-05 NOTE — TELEPHONE ENCOUNTER
Patient came back from a flight last night and feet were swollen and this improved this morning, but getting just as bad for both feet by lunch today.    Pls advise

## 2025-05-09 ENCOUNTER — LAB ENCOUNTER (OUTPATIENT)
Dept: LAB | Facility: HOSPITAL | Age: 35
End: 2025-05-09
Attending: OBSTETRICS & GYNECOLOGY
Payer: COMMERCIAL

## 2025-05-09 ENCOUNTER — ROUTINE PRENATAL (OUTPATIENT)
Dept: OBGYN CLINIC | Facility: CLINIC | Age: 35
End: 2025-05-09

## 2025-05-09 ENCOUNTER — HOSPITAL ENCOUNTER (OUTPATIENT)
Dept: PERINATAL CARE | Facility: HOSPITAL | Age: 35
Discharge: HOME | End: 2025-05-09
Attending: OBSTETRICS & GYNECOLOGY
Payer: COMMERCIAL

## 2025-05-09 VITALS
HEART RATE: 96 BPM | DIASTOLIC BLOOD PRESSURE: 74 MMHG | WEIGHT: 215.38 LBS | SYSTOLIC BLOOD PRESSURE: 105 MMHG | BODY MASS INDEX: 37 KG/M2

## 2025-05-09 VITALS
WEIGHT: 216 LBS | HEART RATE: 120 BPM | SYSTOLIC BLOOD PRESSURE: 114 MMHG | DIASTOLIC BLOOD PRESSURE: 78 MMHG | BODY MASS INDEX: 37 KG/M2

## 2025-05-09 DIAGNOSIS — Z36.89 ENCOUNTER FOR FETAL ANATOMIC SURVEY (HCC): Primary | ICD-10-CM

## 2025-05-09 DIAGNOSIS — O09.521 SUPERVISION OF ELDERLY MULTIGRAVIDA IN FIRST TRIMESTER (HCC): ICD-10-CM

## 2025-05-09 DIAGNOSIS — Z36.89 ENCOUNTER FOR FETAL ANATOMIC SURVEY (HCC): ICD-10-CM

## 2025-05-09 DIAGNOSIS — O99.211 OTHER OBESITY AFFECTING PREGNANCY IN FIRST TRIMESTER (HCC): ICD-10-CM

## 2025-05-09 DIAGNOSIS — O09.529 AMA (ADVANCED MATERNAL AGE) MULTIGRAVIDA 35+ (HCC): ICD-10-CM

## 2025-05-09 DIAGNOSIS — O09.521 MULTIGRAVIDA OF ADVANCED MATERNAL AGE IN FIRST TRIMESTER (HCC): ICD-10-CM

## 2025-05-09 DIAGNOSIS — E66.89 OTHER OBESITY AFFECTING PREGNANCY IN FIRST TRIMESTER (HCC): ICD-10-CM

## 2025-05-09 DIAGNOSIS — Z34.01 ENCOUNTER FOR SUPERVISION OF NORMAL FIRST PREGNANCY IN FIRST TRIMESTER (HCC): Primary | ICD-10-CM

## 2025-05-09 LAB
APPEARANCE: CLEAR
BILIRUBIN: NEGATIVE
GLUCOSE (URINE DIPSTICK): NEGATIVE MG/DL
KETONES (URINE DIPSTICK): NEGATIVE MG/DL
LEUKOCYTES: NEGATIVE
MULTISTIX LOT#: NORMAL NUMERIC
NITRITE, URINE: NEGATIVE
OCCULT BLOOD: NEGATIVE
PH, URINE: 6 (ref 4.5–8)
PROTEIN (URINE DIPSTICK): NEGATIVE MG/DL
SPECIFIC GRAVITY: 1.01 (ref 1–1.03)
URINE-COLOR: YELLOW
UROBILINOGEN,SEMI-QN: 0.2 MG/DL (ref 0–1.9)

## 2025-05-09 PROCEDURE — 76813 OB US NUCHAL MEAS 1 GEST: CPT | Performed by: OBSTETRICS & GYNECOLOGY

## 2025-05-09 PROCEDURE — 81002 URINALYSIS NONAUTO W/O SCOPE: CPT | Performed by: OBSTETRICS & GYNECOLOGY

## 2025-05-09 PROCEDURE — 3074F SYST BP LT 130 MM HG: CPT | Performed by: OBSTETRICS & GYNECOLOGY

## 2025-05-09 PROCEDURE — 3078F DIAST BP <80 MM HG: CPT | Performed by: OBSTETRICS & GYNECOLOGY

## 2025-05-09 RX ORDER — FAMOTIDINE 20 MG/1
20 TABLET, FILM COATED ORAL 2 TIMES DAILY
COMMUNITY

## 2025-05-09 NOTE — PROGRESS NOTES
Just saw M for NIPT and US.  3h GTT scheduled 5/15.  Patient reports occasional brown dc post coitus.  RH+.  Has level 2 scheduled.  RTC 4 wks.

## 2025-05-15 ENCOUNTER — LAB ENCOUNTER (OUTPATIENT)
Dept: LAB | Facility: HOSPITAL | Age: 35
End: 2025-05-15
Attending: OBSTETRICS & GYNECOLOGY
Payer: COMMERCIAL

## 2025-05-15 DIAGNOSIS — O99.810 ABNORMAL GLUCOSE TOLERANCE TEST (GTT) DURING PREGNANCY, ANTEPARTUM (HCC): ICD-10-CM

## 2025-05-15 LAB
GLUCOSE 1H P GLC SERPL-MCNC: 217 MG/DL (ref 70–179)
GLUCOSE 2H P GLC SERPL-MCNC: 156 MG/DL (ref 70–154)
GLUCOSE 3H P GLC SERPL-MCNC: 145 MG/DL (ref 70–140)
GLUCOSE P FAST SERPL-MCNC: 107 MG/DL (ref 70–94)

## 2025-05-15 PROCEDURE — 82952 GTT-ADDED SAMPLES: CPT

## 2025-05-15 PROCEDURE — 36415 COLL VENOUS BLD VENIPUNCTURE: CPT

## 2025-05-15 PROCEDURE — 82951 GLUCOSE TOLERANCE TEST (GTT): CPT

## 2025-05-16 PROBLEM — O24.319 MODIFIED WHITE CLASS B PREGESTATIONAL DIABETES MELLITUS (HCC): Status: ACTIVE | Noted: 2025-05-16

## 2025-05-16 LAB
GESTATIONAL AGE > OR = 9W:: YES
MONOSOMY X (TURNER SYNDROME): NOT DETECTED
TEST RESULT: NEGATIVE
TRISOMY 13 (PATAU SYNDROME): NEGATIVE
TRISOMY 18 (EDWARDS SYNDROME): NEGATIVE
TRISOMY 21 (DOWN SYNDROME): NEGATIVE
XXX (TRIPLE X SYNDROME): NOT DETECTED
XXY (KLINEFELTER SYNDROME): NOT DETECTED
XYY (JACOBS SYNDROME): NOT DETECTED

## 2025-05-16 NOTE — PROGRESS NOTES
Pt advised of results and recs and verbalized understanding. Provided dm Ed # to call for appointment. Also instructed on sending log to clinic.

## 2025-05-19 NOTE — PROGRESS NOTES
Test results reviewed and are normal.  LOW-RISK for trisomies 21, 18 and 13.    Results released via My Chart    Keshawn Cardona M.D.  Maternal-Fetal Medicine

## 2025-05-23 ENCOUNTER — HOSPITAL ENCOUNTER (OUTPATIENT)
Dept: ENDOCRINOLOGY | Facility: HOSPITAL | Age: 35
Discharge: HOME | End: 2025-05-23
Attending: OBSTETRICS & GYNECOLOGY
Payer: COMMERCIAL

## 2025-05-23 DIAGNOSIS — O24.319 MODIFIED WHITE CLASS B PREGESTATIONAL DIABETES MELLITUS (HCC): Primary | ICD-10-CM

## 2025-05-23 DIAGNOSIS — O24.319 PREGESTATIONAL DIABETES MELLITUS, MODIFIED WHITE CLASS B (HCC): ICD-10-CM

## 2025-05-23 RX ORDER — LANCETS 33 GAUGE
1 EACH MISCELLANEOUS 4 TIMES DAILY
Qty: 100 EACH | Refills: 3 | Status: SHIPPED | OUTPATIENT
Start: 2025-05-23 | End: 2026-05-23

## 2025-05-23 RX ORDER — BLOOD-GLUCOSE METER
1 EACH MISCELLANEOUS 4 TIMES DAILY
Qty: 1 KIT | Refills: 0 | Status: SHIPPED | OUTPATIENT
Start: 2025-05-23

## 2025-05-23 RX ORDER — BLOOD SUGAR DIAGNOSTIC
1 STRIP MISCELLANEOUS 4 TIMES DAILY
Qty: 100 STRIP | Refills: 3 | Status: SHIPPED | OUTPATIENT
Start: 2025-05-23

## 2025-05-23 NOTE — PROGRESS NOTES
Leena Norman  : 1990 was seen for Gestational Diabetes Counseling: Individual/Group    Date: 2025   Start time: 9:45 am  End time: 11:00 am     Obtained usual diet history: 3 small meals a day, Patient voiced having difficulty with foods due to morning sicknesses.  Meal 1:8 am   magdaleno egg and cheese sandwich    Meal 2 : time 10 am  sushi or chinese  Meal 3 : time soup or fruits and vegetables  Snack:  fruits or cucumbers    Education:     GDM Overview:  Reviewed gestational diabetes as diagnosis including target blood glucose values.  Benefits, risks, and management options for improving/maintaining glucose control to mother/baby discussed.    Instructed /demonstrated ability to perform blood glucose testing on: contour next    Healthy Eating:  Discussed nutrition concepts for pregnancy/healthy eating and effects of food on BG value.  Timing of meals; what is a carbohydrate, protein, fat.  Taught: Carb counting, label reading, meal planning.  Suggested Calorie Level: 2000    Being Active:  Benefits and effects of activity on BG discussed.     Monitoring:  Instructed on how to use glucose monitor/proper lancet disposal. Testing schedules are:   Fastin-95 mg/dL, Call MD if >95 mg/dL twice in 1 week   2 Hour Post Prandial:  120 md/dL, Call MD if >120 mg/dL twice in 1 week.    Taking Medication:  Reviewed when medication might be indicated.    Reducing Risk:  Effects of elevated blood glucose on mother/baby reviewed.  Discussed management (hyperglycemia, hypoglycemia, sick day, DKA, other) and when to call provider.  Post pregnancy management/prevention of Type 2 DM, and increased risk of having diabetes later in life reviewed.    Healthy Coping:  Family involvement/social support encouraged.  Identification of lifestyle behaviors willing to change discussed.    Training Tools Provided:  Printed materials covering each topic provided.  Support Plan provided and reviewed.    Patient Chosen  Goals:      1. Follow recommended GDM meal plan.   2. Begin testing fasting glucose and 2 hours after meals   3. Please send records to your current ob/gyn or midwife within 3 days - 1 week following this visit                4. Encouraged activity if no restrictions.   5. Encouraged Leena Navarro Emerson to call diabetes center with any questions or concerns.    Patient verbalized understanding and has no further questions at this time.    Darlene Huffman RN

## 2025-05-25 DIAGNOSIS — O21.0 MORNING SICKNESS (HCC): ICD-10-CM

## 2025-05-27 ENCOUNTER — MED REC SCAN ONLY (OUTPATIENT)
Dept: NEUROLOGY | Facility: CLINIC | Age: 35
End: 2025-05-27

## 2025-05-28 RX ORDER — ONDANSETRON 8 MG/1
8 TABLET, FILM COATED ORAL EVERY 8 HOURS PRN
Qty: 90 TABLET | Refills: 1 | Status: SHIPPED | OUTPATIENT
Start: 2025-05-28

## 2025-06-01 ENCOUNTER — MOBILE ENCOUNTER (OUTPATIENT)
Dept: OBGYN CLINIC | Facility: CLINIC | Age: 35
End: 2025-06-01

## 2025-06-02 NOTE — PROGRESS NOTES
On call. Spoke with pt. 17 wks pregnant. Had some brown spotting with cramp. Was more active today. Will rest and hydrate. Monitor. Will call in am if symptoms continue to move up appt

## 2025-06-13 ENCOUNTER — PATIENT MESSAGE (OUTPATIENT)
Dept: OBGYN CLINIC | Facility: CLINIC | Age: 35
End: 2025-06-13

## 2025-06-13 ENCOUNTER — ROUTINE PRENATAL (OUTPATIENT)
Dept: OBGYN CLINIC | Facility: CLINIC | Age: 35
End: 2025-06-13

## 2025-06-13 ENCOUNTER — HOSPITAL ENCOUNTER (OUTPATIENT)
Dept: ENDOCRINOLOGY | Facility: HOSPITAL | Age: 35
Discharge: HOME | End: 2025-06-13
Attending: OBSTETRICS & GYNECOLOGY
Payer: COMMERCIAL

## 2025-06-13 ENCOUNTER — LAB ENCOUNTER (OUTPATIENT)
Dept: LAB | Facility: HOSPITAL | Age: 35
End: 2025-06-13
Attending: OBSTETRICS & GYNECOLOGY
Payer: COMMERCIAL

## 2025-06-13 VITALS — BODY MASS INDEX: 38 KG/M2 | WEIGHT: 220.81 LBS

## 2025-06-13 VITALS
DIASTOLIC BLOOD PRESSURE: 75 MMHG | SYSTOLIC BLOOD PRESSURE: 106 MMHG | HEART RATE: 83 BPM | WEIGHT: 219 LBS | BODY MASS INDEX: 38 KG/M2

## 2025-06-13 DIAGNOSIS — Z34.92 ENCOUNTER FOR SUPERVISION OF NORMAL PREGNANCY IN SECOND TRIMESTER, UNSPECIFIED GRAVIDITY (HCC): Primary | ICD-10-CM

## 2025-06-13 DIAGNOSIS — O24.319 PREGESTATIONAL DIABETES MELLITUS, MODIFIED WHITE CLASS B (HCC): Primary | ICD-10-CM

## 2025-06-13 DIAGNOSIS — O24.319 MODIFIED WHITE CLASS B PREGESTATIONAL DIABETES MELLITUS (HCC): Primary | ICD-10-CM

## 2025-06-13 DIAGNOSIS — O24.319 PREGESTATIONAL DIABETES MELLITUS, MODIFIED WHITE CLASS B (HCC): ICD-10-CM

## 2025-06-13 PROBLEM — R73.9 HYPERGLYCEMIA: Status: RESOLVED | Noted: 2024-12-06 | Resolved: 2025-06-13

## 2025-06-13 PROBLEM — Z31.69 ENCOUNTER FOR PRECONCEPTION CONSULTATION: Status: RESOLVED | Noted: 2024-09-26 | Resolved: 2025-06-13

## 2025-06-13 LAB
ALBUMIN SERPL-MCNC: 4.3 G/DL (ref 3.2–4.8)
ALBUMIN/GLOB SERPL: 2 {RATIO} (ref 1–2)
ALP LIVER SERPL-CCNC: 61 U/L (ref 37–98)
ALT SERPL-CCNC: 9 U/L (ref 10–49)
ANION GAP SERPL CALC-SCNC: 9 MMOL/L (ref 0–18)
AST SERPL-CCNC: 10 U/L (ref ?–34)
BILIRUB SERPL-MCNC: 0.3 MG/DL (ref 0.3–1.2)
BILIRUBIN: NEGATIVE
BUN BLD-MCNC: <5 MG/DL (ref 9–23)
CALCIUM BLD-MCNC: 9.4 MG/DL (ref 8.7–10.4)
CHLORIDE SERPL-SCNC: 105 MMOL/L (ref 98–112)
CO2 SERPL-SCNC: 24 MMOL/L (ref 21–32)
CREAT BLD-MCNC: 0.64 MG/DL (ref 0.55–1.02)
CREAT UR-SCNC: 33.7 MG/DL
EGFRCR SERPLBLD CKD-EPI 2021: 118 ML/MIN/1.73M2 (ref 60–?)
FASTING STATUS PATIENT QL REPORTED: YES
GLOBULIN PLAS-MCNC: 2.1 G/DL (ref 2–3.5)
GLUCOSE (URINE DIPSTICK): NEGATIVE MG/DL
GLUCOSE BLD-MCNC: 90 MG/DL (ref 70–99)
KETONES (URINE DIPSTICK): NEGATIVE MG/DL
LEUKOCYTES: NEGATIVE
MULTISTIX LOT#: 57 NUMERIC
NITRITE, URINE: NEGATIVE
OCCULT BLOOD: NEGATIVE
PH, URINE: 7.5 (ref 4.5–8)
POTASSIUM SERPL-SCNC: 3.7 MMOL/L (ref 3.5–5.1)
PROT SERPL-MCNC: 6.4 G/DL (ref 5.7–8.2)
PROT UR-MCNC: <6 MG/DL (ref ?–14)
PROTEIN (URINE DIPSTICK): NEGATIVE MG/DL
SODIUM SERPL-SCNC: 138 MMOL/L (ref 136–145)
SPECIFIC GRAVITY: 1.01 (ref 1–1.03)
UROBILINOGEN,SEMI-QN: 0.2 MG/DL (ref 0–1.9)

## 2025-06-13 PROCEDURE — 80053 COMPREHEN METABOLIC PANEL: CPT

## 2025-06-13 PROCEDURE — 84156 ASSAY OF PROTEIN URINE: CPT

## 2025-06-13 PROCEDURE — 3074F SYST BP LT 130 MM HG: CPT | Performed by: OBSTETRICS & GYNECOLOGY

## 2025-06-13 PROCEDURE — 36415 COLL VENOUS BLD VENIPUNCTURE: CPT

## 2025-06-13 PROCEDURE — 3078F DIAST BP <80 MM HG: CPT | Performed by: OBSTETRICS & GYNECOLOGY

## 2025-06-13 PROCEDURE — 82570 ASSAY OF URINE CREATININE: CPT

## 2025-06-13 PROCEDURE — 81002 URINALYSIS NONAUTO W/O SCOPE: CPT | Performed by: OBSTETRICS & GYNECOLOGY

## 2025-06-13 RX ORDER — INSULIN ASPART 100 [IU]/ML
2 INJECTION, SOLUTION INTRAVENOUS; SUBCUTANEOUS
Qty: 3 ML | Refills: 3 | Status: SHIPPED | OUTPATIENT
Start: 2025-06-13

## 2025-06-13 RX ORDER — PEN NEEDLE, DIABETIC 32GX 5/32"
NEEDLE, DISPOSABLE MISCELLANEOUS
Qty: 100 EACH | Refills: 3 | Status: SHIPPED | OUTPATIENT
Start: 2025-06-13

## 2025-06-13 NOTE — PROGRESS NOTES
Has not been sending BS.   Will record and send today.  States she had elevated FBS and dinner.   Already seen DM ed and was taught how to use insulin.  RTC 4 wk

## 2025-06-13 NOTE — PROGRESS NOTES
Leena Norman  : 1990 was seen for GDM  Individual Follow-Up Counseling    Date: 2025  Referring Provider: Dr Fuentes  Start time: 10:00 am  End time: 10:45 am     Assessment: Wt 220 lb 12.8 oz   LMP 2025 (Exact Date)   BMI 37.90 kg/m²   Weight:   Wt Readings from Last 6 Encounters:   25 220 lb 12.8 oz   25 216 lb   25 215 lb 6.4 oz   25 216 lb   25 217 lb   25 199 lb     BG levels taken from phone hiram. Bg readings from the most recent week not available  Blood Glucose: FBG: (107-120)107,114,107,108,111,107   PP: B: 109-155;126,114,109,116,130,120,155   L: 102-200; 132,126,200,102,140,131   D: 104-162; 149,104,146,162,126  Persistent patterns of hyperglycemia noted fasting, post lunch and dinner. Patient will benefit from the start of insulin. Patient expressed reluctance to the idea of incorporating insulin therapy. Reviewed gestational diabetes pathophysiology and the role of insulin in glucose control. Explained that insulin does not cross the placenta. Discussed the benefits of controlled glucose levels for mom and the baby. All questions answered. Patient voiced understanding.     Provided education on insulin administration via pen .   Educated on insulin storage Discussed rapid, basal and mixed insulin action peak and duration.    Instructed on operation of device and patient was able to do correct return demonstration.  Instructed on site selection and rotation of injections.  Instructed on proper disposal of sharps.  Reviewed proper storage of medication.  Reviewed blood glucose testing.  Reviewed hypoglycemia and the Rule of 15.  Patient set goals for glucose management.  Gestational Diabetes goals assessed by patient: 2 - occasional, barriers to attaining goals discussed and additional modifications to goals made    Diet:   No food log available for review. The following assessment is based on the patient's verbal report.   Following meal  plan: Yes/No: Yes  Skips: none  Meals are Not balanced.  Carb Intake is inadequate. Less than the recommended amount at times  Protein Intake is adequate.  Food Selections are healthy.    Exercise:     No specific exercise plan in place. Patient walks intermittently trough out the week.     Education:     GDM Overview:  Reviewed target blood glucose values for GDM.  Discussed benefits/risks to mother/baby management options to improve/maintain glucose control.     Healthy Eating:  Reviewed/Reinforced:  Nutrition concepts for pregnancy/healthy eating and effect of food on blood glucose.  Meal planning process and benefits of pre-planning meals/snacks.  Appropriate timing of meals/snacks.  Carb counting.  Additional concepts: Increasing fiber    Being Active:  Reviewed benefits of effects of activity on BG values.  Reviewed types of activity, duration, precautions.    Monitoring:  Instructed to report readings to MD as directed.  Call MD: if FBG is > 95 twice in 1 week.     If 2 hr PP is > 120 twice in 1 week at any one meal.  Call Diabetic Educator is ketones are consistently elevated.    Taking Medication:  Reviewed appropriate timing (if on insulin) of meds.   Reviewed probability of needing medication adjustments throughout pregnancy.     Reducing Risk:  Discussed management of (hyperglycemia, hypoglycemia) and when to call provider.    Recommendations:      1. Follow recommended meal plan.   2. Test blood glucose and ketones as directed.    3. Please send blood glucose records to your current ob/gyn or midwife within 3 days - 1      week following this visit or as requested by your provider.   4. Start/continue activity if no restrictions.    5. Additional recommendations: follow up with providers as indicated.    Patient verbalized understanding and has no further questions at this time.    Darlene Huffman RN

## 2025-06-13 NOTE — PROGRESS NOTES
Leena left copy of blood sugar log with . Reviewed by Dr. Mercado. Wishes for patient to start insulin. 0+0+2+2 N. 0xdata message sent to patient and medication ordered.

## 2025-06-18 ENCOUNTER — PATIENT MESSAGE (OUTPATIENT)
Dept: OBGYN CLINIC | Facility: CLINIC | Age: 35
End: 2025-06-18

## 2025-06-18 ENCOUNTER — TELEPHONE (OUTPATIENT)
Dept: OBGYN CLINIC | Facility: CLINIC | Age: 35
End: 2025-06-18

## 2025-06-18 RX ORDER — BLOOD SUGAR DIAGNOSTIC
STRIP MISCELLANEOUS
Qty: 90 EACH | Refills: 1 | Status: SHIPPED | OUTPATIENT
Start: 2025-06-18

## 2025-06-18 NOTE — TELEPHONE ENCOUNTER
Spoke to Dr. John, she reviewed log values. TORB for the following insulin changes. 0+2+4+6 NPH. Leena notified.

## 2025-06-18 NOTE — TELEPHONE ENCOUNTER
Received prescription request for insulin syringes. Prescription sent for #90 syringes plus one refill.

## 2025-06-18 NOTE — TELEPHONE ENCOUNTER
19w2d   0+0+2+2N  Maternal Fetal Medicine 6/27 Level 2  GINNY 7/12    To Dr. John on-call please review and advise. Thank you.

## 2025-06-27 ENCOUNTER — HOSPITAL ENCOUNTER (OUTPATIENT)
Dept: PERINATAL CARE | Facility: HOSPITAL | Age: 35
Discharge: HOME | End: 2025-06-27
Attending: OBSTETRICS & GYNECOLOGY
Payer: COMMERCIAL

## 2025-06-27 VITALS
SYSTOLIC BLOOD PRESSURE: 114 MMHG | WEIGHT: 220 LBS | DIASTOLIC BLOOD PRESSURE: 74 MMHG | BODY MASS INDEX: 38 KG/M2 | HEART RATE: 105 BPM

## 2025-06-27 DIAGNOSIS — Z36.89 ENCOUNTER FOR FETAL ANATOMIC SURVEY (HCC): ICD-10-CM

## 2025-06-27 DIAGNOSIS — O24.319 MODIFIED WHITE CLASS B PREGESTATIONAL DIABETES MELLITUS (HCC): ICD-10-CM

## 2025-06-27 DIAGNOSIS — O99.212 OTHER OBESITY DUE TO EXCESS CALORIES AFFECTING PREGNANCY IN SECOND TRIMESTER (HCC): ICD-10-CM

## 2025-06-27 DIAGNOSIS — O09.512 PRIMIGRAVIDA OF ADVANCED MATERNAL AGE IN SECOND TRIMESTER (HCC): ICD-10-CM

## 2025-06-27 DIAGNOSIS — O09.522 MULTIGRAVIDA OF ADVANCED MATERNAL AGE IN SECOND TRIMESTER (HCC): ICD-10-CM

## 2025-06-27 DIAGNOSIS — E66.09 OTHER OBESITY DUE TO EXCESS CALORIES AFFECTING PREGNANCY IN SECOND TRIMESTER (HCC): ICD-10-CM

## 2025-06-27 DIAGNOSIS — O09.522 MULTIGRAVIDA OF ADVANCED MATERNAL AGE IN SECOND TRIMESTER (HCC): Primary | ICD-10-CM

## 2025-06-27 PROCEDURE — 76811 OB US DETAILED SNGL FETUS: CPT | Performed by: OBSTETRICS & GYNECOLOGY

## 2025-06-27 NOTE — PROGRESS NOTES
Outpatient Maternal-Fetal Medicine Follow-Up     Dear Dr. Amos,     Thank you for requesting ultrasound evaluation and maternal fetal medicine consultation on your patient Leena Norman.  As you are aware she is a 35/36 year old female  with a Bocanegra pregnancy and an Estimated Date of Delivery: 11/10/25.  She returned to maternal-fetal medicine today for a follow-up visit.  Her history was reviewed from her prior visit and there were no interval changes.     Antepartum Risk Factors  Advanced maternal age, low risk cell free DNA screen  Obesity complicating pregnancy  Early gestational diabetes, insulin requiring      S: She has started to feel quickening.  She is doing very well on the carbohydrate modified diet of gestational diabetes.  She actually finds she is feeling more energetic and has more focus.    She has been started on insulin and is taking that without difficulty    PHYSICAL EXAMINATION:  /74   Pulse 105   Wt 220 lb (99.8 kg)   LMP 2025 (Exact Date)   BMI 37.76 kg/m²   General: alert and oriented, no acute distress  Abdomen: gravid, soft, non-tender  Extremities: non-tender, no edema     OBSTETRIC ULTRASOUND  The patient had a level 2 ultrasound today which I interpreted the results and reviewed them with the patient.    Ultrasound findings:   Single IUP in cephalic presentation.    Placenta is posterior.   A 3 vessel cord is noted.  Insertion site: normal insertion  Cardiac activity is present at 147 bpm   g ( 0 lb 15 oz);   MVP is 5.2 cm  Cervix  measured  38 mm transabdominally   Uterus  appeared normal today; ovaries were not seen    The fetal measurements are consistent with the established SYLVIA. No ultrasound evidence of structural abnormalities are seen today. The nasal bone is present. No ultrasound evidence of markers for aneuploidy are seen. She understands that ultrasound exam cannot exclude genetic abnormalities and that genetic testing is recommended. The  limitations of ultrasound were discussed.    See imaging tab for the complete US report.     DISCUSSION  During her visit we discussed and reviewed the following issues:     ADVANCED MATERNAL AGE -follow-up    I reviewed with the patient that pregnancies in women of advanced maternal age (35 or older at delivery) are associated with elevated risks.  Specifically, there is a higher rate of:     Fetal malformations  Preeclampsia  Gestational diabetes  Intrauterine fetal death     As a result, enhanced pregnancy surveillance is advised for these patients including a comprehensive ultrasound to assess for fetal malformations  (at 20 weeks) and a third trimester ultrasound assessment for fetal growth (at 32 weeks).  In addition, weekly NST's (initiating at 36 weeks gestation for women 35-39 years and at 32 weeks gestation for women 40 years and older) are also advised.  Routine obstetric care is more than adequate to assess for gestational diabetes and preeclampsia; hence, no further significant alterations in obstetric care are advised.  See MFM note from 2025 for detailed review.     Non-invasive Pregnancy Testing (NIPT) -we reviewed that her maternity 21 screen came back as low risk for the common aneuploidies.  The role limitations of level 2 ultrasound were discussed.    We discussed her low risk cell free DNA screen and her normal level II ultrasound today.  We discussed her option for amniocentesis but that the likelihood of finding a genetic concern is quite low after her other low risk evaluations.  She appropriately declined invasive prenatal genetic diagnostic testing.       OBESITY:  Her BMI prior to pregnancy was 37 (wt 217 lbs)  Obesity during pregnancy is associated with numerous maternal and  risks which were discussed previously and reviewed.  See MFM note from 2025 for detailed review.  We discussed the current recommendations for limited gestational weight gain in pregnancy for  overweight and obese women.  The [place] [employer] currently recommends that women keep gestational weight gain to between 8-18 lbs.  We discussed the role of mild to moderate exercise, healthy food choices and appropriate portions sized to help achieve this goal.  Excess weight gain is associated with higher rates of gestational diabetes, hypertensive complications, fetal macrosomia and delivery complications.  Women with weight loss or insufficient weight gain have higher rates of small for gestational age infants.    A recent study found that initiating moderate exercise in early pregnancy for obese gravidas significantly reduced the incidence of gestational diabetes, gestational hypertension,  deliveries and C-sections.    I encouraged Leena to begin moderate exercise such as walking or stationary bike in the pregnancy.  She is limited in her exercise because she does have a lot of mucus production in the pregnancy and she finds if she exerts herself she starts gagging.  She does make an effort to try to go up and down the stairs in her home 2-3 times a day.  She is also trying to take very short walks.    GESTATIONAL DIABETES    3 hour GTT  Lab Results   Component Value Date    GLUFG 107 (H) 05/15/2025    GLU1G 217 (H) 05/15/2025    GLU2G 156 (H) 05/15/2025    GLU3G 145 (H) 05/15/2025       The patient was informed of the potential implications and risks associated with GDM to her and her fetus, especially when poorly controlled. We discussed the increased incidence of macrosomia and the potential for related birth injury to her and her baby. We talked about the increase risks associated risk of fetal hyperinsulinemia, jaundice, electrolyte imbalance, seizure activity, IUFD and other adverse obstetric outcomes. We also reviewed her  increased risk of having diabetes later in life. The importance of good glycemic control and avoidance of prolonged hypoglycemia and hyperglycemia was addressed.    She  was instructed on the diabetic diet; her diet was modified to provide three meals and three snacks with fewer carbohydrates.  She received instruction on self-monitoring of blood glucose.  She was advised to assess  her blood sugars four times daily (fasting and 2 hour postprandially).   Fasting blood glucose should be less than 95 mg/dl and two hour postprandial values should be less than 120 mg/dl.   She was also  Advised to send her capillary blood glucose records to our office for weekly MFM review. Insulin therapy may be started depending on her blood sugar levels.    Her capillary blood glucose records were discussed today and she reports that on the insulin she is mostly meeting her blood sugar goals.    The patient is presently on diet therapy; her compliance with her diabetic diet regimen was reviewed and it is good.       Her compliance with her insulin regimen was reviewed and it is excellent.   Her current insulin regimen is:  NPH  8 units qPM  Novolog 0 units with breakfast, 2 units with lunch, 6 units with dinner.    Medical Regimen Recommendation:   Continue ADA diet, insulin and management with her OB providers       We discussed the recommended plan of care based on her  risk factors.  Leena and her significant other, Davion, had their questions answered to their satisfaction.       IMPRESSION:  IUP at 20w4d  Normal level II Ultrasound  AMA: low risk cell free fetal DNA, declined invasive testing  Obesity, class II  GDM A2     RECOMMENDATIONS:  Continue care with Dr. Amos  Monthly growth ultrasounds starting at 28 weeks with the addition of a BPP with each growth assessment 32 weeks and beyond.  Weekly NST at 32 weeks and increase to twice weekly at 34 weeks  Delivery is advised at 38 to 39 weeks 6 days for well-controlled GDM A2  11-20 lb weight gain for pregnancy       Total time spent 40 minutes this calendar day which includes preparing to see the patient including chart review, obtaining  and/or reviewing additional medical history, performing a physical exam and evaluation, documenting clinical information in the electronic medical record, independently interpreting results, counseling the patient, communicating results to the patient/family/caregiver and coordinating care.     Case discussed with patient who demonstrated understanding and agreement with plan.     Thank you for allowing me to participate in the care of this patient.  Please feel free to contact me with any questions.    Viri Valerio MD  Maternal-Fetal Medicine       Note to patient and family:  The 21st Century Cures Act makes medical notes available to patients in the interest of transparency.  However, please be advised that this is a medical document.  It is intended as a peer to peer communication.  It is written in medical language and may contain abbreviations or verbiage that are technical and unfamiliar.  It may appear blunt or direct.  Medical documents are intended to carry relevant information, facts as evident, and the clinical opinion of the practitioner.

## 2025-07-12 ENCOUNTER — RESULTS FOLLOW-UP (OUTPATIENT)
Dept: OBGYN CLINIC | Facility: CLINIC | Age: 35
End: 2025-07-12

## 2025-07-12 ENCOUNTER — TELEPHONE (OUTPATIENT)
Dept: OBGYN CLINIC | Facility: CLINIC | Age: 35
End: 2025-07-12

## 2025-07-12 ENCOUNTER — LAB ENCOUNTER (OUTPATIENT)
Dept: LAB | Facility: HOSPITAL | Age: 35
End: 2025-07-12
Attending: OBSTETRICS & GYNECOLOGY
Payer: COMMERCIAL

## 2025-07-12 ENCOUNTER — ROUTINE PRENATAL (OUTPATIENT)
Dept: OBGYN CLINIC | Facility: CLINIC | Age: 35
End: 2025-07-12

## 2025-07-12 VITALS
WEIGHT: 220.81 LBS | SYSTOLIC BLOOD PRESSURE: 108 MMHG | BODY MASS INDEX: 38 KG/M2 | HEART RATE: 100 BPM | DIASTOLIC BLOOD PRESSURE: 70 MMHG

## 2025-07-12 DIAGNOSIS — R30.9 PAIN WITH URINATION: Primary | ICD-10-CM

## 2025-07-12 DIAGNOSIS — Z34.92 ENCOUNTER FOR SUPERVISION OF NORMAL PREGNANCY IN SECOND TRIMESTER, UNSPECIFIED GRAVIDITY (HCC): Primary | ICD-10-CM

## 2025-07-12 DIAGNOSIS — R30.9 PAIN WITH URINATION: ICD-10-CM

## 2025-07-12 DIAGNOSIS — Z34.92 ENCOUNTER FOR SUPERVISION OF NORMAL PREGNANCY IN SECOND TRIMESTER, UNSPECIFIED GRAVIDITY (HCC): ICD-10-CM

## 2025-07-12 PROBLEM — R31.0 GROSS HEMATURIA: Status: RESOLVED | Noted: 2024-12-06 | Resolved: 2025-07-12

## 2025-07-12 PROBLEM — N30.01 ACUTE CYSTITIS WITH HEMATURIA: Status: RESOLVED | Noted: 2024-12-06 | Resolved: 2025-07-12

## 2025-07-12 LAB
APPEARANCE: CLEAR
BILIRUB UR QL: NEGATIVE
BILIRUBIN: NEGATIVE
CLARITY UR: CLEAR
DEPRECATED RDW RBC AUTO: 42.1 FL (ref 35.1–46.3)
ERYTHROCYTE [DISTWIDTH] IN BLOOD BY AUTOMATED COUNT: 14.5 % (ref 11–15)
GLUCOSE (URINE DIPSTICK): NEGATIVE MG/DL
GLUCOSE UR-MCNC: NORMAL MG/DL
HCT VFR BLD AUTO: 31.7 % (ref 35–48)
HGB BLD-MCNC: 10.2 G/DL (ref 12–16)
HGB UR QL STRIP.AUTO: NEGATIVE
KETONES (URINE DIPSTICK): NEGATIVE MG/DL
KETONES UR-MCNC: NEGATIVE MG/DL
LEUKOCYTE ESTERASE UR QL STRIP.AUTO: 250
MCH RBC QN AUTO: 25.7 PG (ref 26–34)
MCHC RBC AUTO-ENTMCNC: 32.2 G/DL (ref 31–37)
MCV RBC AUTO: 79.8 FL (ref 80–100)
MULTISTIX LOT#: ABNORMAL NUMERIC
NITRITE UR QL STRIP.AUTO: NEGATIVE
NITRITE, URINE: NEGATIVE
PH UR: 6.5 [PH] (ref 5–8)
PH, URINE: 5 (ref 4.5–8)
PLATELET # BLD AUTO: 299 10(3)UL (ref 150–450)
PROT UR-MCNC: NEGATIVE MG/DL
PROTEIN (URINE DIPSTICK): NEGATIVE MG/DL
RBC # BLD AUTO: 3.97 X10(6)UL (ref 3.8–5.3)
SP GR UR STRIP: 1.01 (ref 1–1.03)
SPECIFIC GRAVITY: 1.01 (ref 1–1.03)
URINE-COLOR: YELLOW
UROBILINOGEN UR STRIP-ACNC: NORMAL
UROBILINOGEN,SEMI-QN: 0.2 MG/DL (ref 0–1.9)
WBC # BLD AUTO: 12.3 X10(3) UL (ref 4–11)

## 2025-07-12 PROCEDURE — 3074F SYST BP LT 130 MM HG: CPT | Performed by: OBSTETRICS & GYNECOLOGY

## 2025-07-12 PROCEDURE — 85027 COMPLETE CBC AUTOMATED: CPT

## 2025-07-12 PROCEDURE — 81002 URINALYSIS NONAUTO W/O SCOPE: CPT | Performed by: OBSTETRICS & GYNECOLOGY

## 2025-07-12 PROCEDURE — 36415 COLL VENOUS BLD VENIPUNCTURE: CPT

## 2025-07-12 PROCEDURE — 87086 URINE CULTURE/COLONY COUNT: CPT

## 2025-07-12 PROCEDURE — 81001 URINALYSIS AUTO W/SCOPE: CPT

## 2025-07-12 PROCEDURE — 3078F DIAST BP <80 MM HG: CPT | Performed by: OBSTETRICS & GYNECOLOGY

## 2025-07-12 NOTE — TELEPHONE ENCOUNTER
Assisted patient with scheduling prenatal appointment on 9/4/25 in [place].   Patient also asking about urine dip result from today's prenatal appointment. State she saw result was trace for blood and leucocytes. States she is not having symptoms today. Reports she had symptoms within the last 2 weeks, had pain with urination. Advised patient to do UA to be sure if she has UTI. Patient will go to the lab now. Informed patient we close at 12 pm today and will not review result until Monday. Advised patient to call to speak with on call if develops pain or burning with urination or frequency or incomplete emptying.

## 2025-07-12 NOTE — TELEPHONE ENCOUNTER
Patient needs to come back the week of 09/01 and still needs to see MARGY, Lizandro schedule is blocked on 09/03 please contact patient if MARGY is able to see her on 09/03

## 2025-07-14 ENCOUNTER — TELEPHONE (OUTPATIENT)
Dept: OBGYN CLINIC | Facility: CLINIC | Age: 35
End: 2025-07-14

## 2025-07-14 NOTE — TELEPHONE ENCOUNTER
23w0d.  Patient states she is experiencing urgency and a small stream, and bladder spasms.  Patient states she had a bad UTI in 12/2024 and is worried this is happening again.  Patient informed the urine culture done on 7/12 showed probably contamination and no UTI.  Patient relieved.  States she thinks the symptoms she is having could be due to the baby sitting on her bladder.  Patient informed to call if symptoms worsen or if new symptoms develop.

## 2025-07-15 DIAGNOSIS — G43.E01 CHRONIC MIGRAINE WITH AURA AND WITH STATUS MIGRAINOSUS, NOT INTRACTABLE: ICD-10-CM

## 2025-07-15 DIAGNOSIS — R00.2 INTERMITTENT PALPITATIONS: ICD-10-CM

## 2025-07-15 DIAGNOSIS — F41.9 ANXIETY: ICD-10-CM

## 2025-07-15 NOTE — TELEPHONE ENCOUNTER
--- Message from Ranjana John sent at 7/12/2025 12:38 PM CDT -----  Needs to start daily iron  ----- Message -----  From: Lab, Background User  Sent: 7/12/2025  12:32 PM CDT  To: Ranjana John MD     Pt informed of results and recs and verbalized understanding.

## 2025-07-15 NOTE — TELEPHONE ENCOUNTER
----- Message from Ranjana John sent at 7/12/2025 12:38 PM CDT -----  Needs to start daily iron  ----- Message -----  From: Lab, Background User  Sent: 7/12/2025  12:32 PM CDT  To: Ranjana John MD

## 2025-07-17 DIAGNOSIS — O24.319 MODIFIED WHITE CLASS B PREGESTATIONAL DIABETES MELLITUS (HCC): ICD-10-CM

## 2025-07-17 RX ORDER — BLOOD SUGAR DIAGNOSTIC
STRIP MISCELLANEOUS
Qty: 200 EACH | Refills: 2 | Status: SHIPPED | OUTPATIENT
Start: 2025-07-17

## 2025-07-17 RX ORDER — LANCETS 23 GAUGE
EACH MISCELLANEOUS
Qty: 200 EACH | Refills: 2 | Status: SHIPPED | OUTPATIENT
Start: 2025-07-17

## 2025-07-17 RX ORDER — BLOOD-GLUCOSE METER
1 EACH MISCELLANEOUS AS DIRECTED
Qty: 1 KIT | Refills: 0 | Status: SHIPPED | OUTPATIENT
Start: 2025-07-17

## 2025-07-17 NOTE — TELEPHONE ENCOUNTER
Fax received from [employer] that [employer] supplies no longer covered.   Request for Accu-check meter, strips and lancets.     Updated prescriptions sent.

## 2025-07-18 ENCOUNTER — PATIENT MESSAGE (OUTPATIENT)
Dept: CASE MANAGEMENT | Age: 35
End: 2025-07-18

## 2025-07-21 RX ORDER — PROPRANOLOL HYDROCHLORIDE 60 MG/1
1 CAPSULE, EXTENDED RELEASE ORAL DAILY
Qty: 90 CAPSULE | Refills: 3 | Status: SHIPPED | OUTPATIENT
Start: 2025-07-21

## 2025-07-21 NOTE — TELEPHONE ENCOUNTER
Please review pended refill request as unable to refill due to high/very high interaction warning copied here:    High  Drug-Drug: albuterol and Propranolol HCl ERPharmacologic effects of beta-2 agonists (eg, Beta-2 Agonists) may be decreased by beta-adrenergic blockers (eg, Beta Blockers). Untoward physiologic effects, characterized by bronchospasm, may occur.  Details

## 2025-07-23 ENCOUNTER — PATIENT MESSAGE (OUTPATIENT)
Dept: OBGYN CLINIC | Facility: CLINIC | Age: 35
End: 2025-07-23

## 2025-07-25 ENCOUNTER — PATIENT MESSAGE (OUTPATIENT)
Dept: CASE MANAGEMENT | Age: 35
End: 2025-07-25

## 2025-07-25 ENCOUNTER — HOSPITAL ENCOUNTER (OUTPATIENT)
Dept: ENDOCRINOLOGY | Facility: HOSPITAL | Age: 35
Discharge: HOME | End: 2025-07-25
Attending: OBSTETRICS & GYNECOLOGY
Payer: COMMERCIAL

## 2025-07-25 VITALS — BODY MASS INDEX: 38 KG/M2 | WEIGHT: 223.81 LBS

## 2025-07-25 DIAGNOSIS — O24.319 PREGESTATIONAL DIABETES MELLITUS, MODIFIED WHITE CLASS B (HCC): Primary | ICD-10-CM

## 2025-07-25 NOTE — PROGRESS NOTES
Leena Norman  : 1990 was seen for GDM  Individual Follow-Up Counseling    Date: 2025   Start time: 1430  End time: 1530    Assessment: Wt 223 lb 12.8 oz   LMP 2025 (Exact Date)   BMI 38.42 kg/m²   Weight:   Wt Readings from Last 6 Encounters:   25 223 lb 12.8 oz   25 220 lb 12.8 oz   25 220 lb   25 220 lb 12.8 oz   25 219 lb   25 216 lb     2nd follow up visit for pt.  She is taking insulin and sending them to OB office q 3 days.    Current insulin regimen:  NPH- 12 units at betime  Novolog-     Blood Glucose over past 7 days:   FB-97 (1 at 107- pt forgot to take her NPH insulin the night before  2 hr. PP: B: all WNL excpet 1 at 123  2 hr PP; L: all WNL but 1 at 127 and 1 at 138  2 hr PP; D: all WNL except 1 at 124 and 1 at 129    Gestational Diabetes goals assessed by patient: 4 - frequently, continue with current goals/plan      Diet:     Reviewed meal plan today.  Pt likes the meal plan and demonstrates a good understanding.  She admits that she could use to fine-tune her carb intake better to match carb targets.  Some meals or snacks may be below carb targets due to the use of low carb food products.      Exercise:   Nothing regular       Education:     GDM Overview:  Reviewed target blood glucose values for GDM.  Discussed benefits/risks to mother/baby management options to improve/maintain glucose control.     Healthy Eating:  Reviewed/Reinforced:  Nutrition concepts for pregnancy/healthy eating and effect of food on blood glucose.  Meal planning process and benefits of pre-planning meals/snacks.  Appropriate timing of meals/snacks.  Carb counting.  Additional concepts: Pt to count carbs and total up for meals/snacks and for the day to be sure it's within carb targets in meal plan.        Being Active:  Reviewed benefits of effects of activity on BG values.  Reviewed types of activity, duration, precautions.    Monitoring:  Instructed to  report readings to MD as directed.  Call MD: if FBG is > 95 twice in 1 week.     If 2 hr PP is > 120 twice in 1 week at any one meal.    Taking Medication:  Reviewed appropriate timing (if on insulin) of meds.   Reviewed probability of needing medication adjustments throughout pregnancy.   Reviewed insulin injections technique verbally.    Reducing Risk:  Discussed management of (hyperglycemia, hypoglycemia) and when to call provider.      Recommendations:      1. Follow recommended meal plan.   2. Test blood glucose and ketones as directed.    3.  Please send blood glucose records to your current ob/gyn or midwife within                                         3 days - 1 week following this visit or as requested by your provider.   4. Start/continue activity if no restrictions.    5. Additional recommendations: Pt to count carbs and total up for meals/snacks and for the day to be sure it's within carb targets in meal plan.       Patient verbalized understanding and has no further questions at this time.    Gisele Oates RD

## 2025-07-29 ENCOUNTER — TELEPHONE (OUTPATIENT)
Dept: OBGYN CLINIC | Facility: CLINIC | Age: 35
End: 2025-07-29

## 2025-08-07 ENCOUNTER — ROUTINE PRENATAL (OUTPATIENT)
Dept: OBGYN CLINIC | Facility: CLINIC | Age: 35
End: 2025-08-07

## 2025-08-07 VITALS
DIASTOLIC BLOOD PRESSURE: 69 MMHG | BODY MASS INDEX: 39 KG/M2 | HEART RATE: 96 BPM | SYSTOLIC BLOOD PRESSURE: 106 MMHG | WEIGHT: 224.38 LBS

## 2025-08-07 DIAGNOSIS — Z34.92 ENCOUNTER FOR SUPERVISION OF NORMAL PREGNANCY IN SECOND TRIMESTER, UNSPECIFIED GRAVIDITY (HCC): Primary | ICD-10-CM

## 2025-08-07 DIAGNOSIS — F32.A DEPRESSION AFFECTING PREGNANCY (HCC): ICD-10-CM

## 2025-08-07 DIAGNOSIS — O99.340 DEPRESSION AFFECTING PREGNANCY (HCC): ICD-10-CM

## 2025-08-07 PROBLEM — O99.019 ANTEPARTUM ANEMIA (HCC): Status: ACTIVE | Noted: 2025-08-07

## 2025-08-07 LAB
APPEARANCE: CLEAR
BILIRUBIN: NEGATIVE
GLUCOSE (URINE DIPSTICK): NEGATIVE MG/DL
KETONES (URINE DIPSTICK): NEGATIVE MG/DL
MULTISTIX LOT#: ABNORMAL NUMERIC
NITRITE, URINE: NEGATIVE
OCCULT BLOOD: NEGATIVE
PH, URINE: 7.5 (ref 4.5–8)
PROTEIN (URINE DIPSTICK): NEGATIVE MG/DL
SPECIFIC GRAVITY: 1 (ref 1–1.03)
URINE-COLOR: YELLOW
UROBILINOGEN,SEMI-QN: 0.2 MG/DL (ref 0–1.9)

## 2025-08-07 PROCEDURE — 3078F DIAST BP <80 MM HG: CPT | Performed by: OBSTETRICS & GYNECOLOGY

## 2025-08-07 PROCEDURE — 3074F SYST BP LT 130 MM HG: CPT | Performed by: OBSTETRICS & GYNECOLOGY

## 2025-08-07 PROCEDURE — 81002 URINALYSIS NONAUTO W/O SCOPE: CPT | Performed by: OBSTETRICS & GYNECOLOGY

## 2025-08-07 RX ORDER — FERROUS SULFATE 325(65) MG
325 TABLET, DELAYED RELEASE (ENTERIC COATED) ORAL
COMMUNITY

## 2025-08-09 ENCOUNTER — HOSPITAL ENCOUNTER (OUTPATIENT)
Facility: HOSPITAL | Age: 35
Discharge: HOME | End: 2025-08-09
Attending: OBSTETRICS & GYNECOLOGY | Admitting: OBSTETRICS & GYNECOLOGY

## 2025-08-09 VITALS
HEIGHT: 64 IN | TEMPERATURE: 98 F | SYSTOLIC BLOOD PRESSURE: 115 MMHG | BODY MASS INDEX: 33.97 KG/M2 | DIASTOLIC BLOOD PRESSURE: 63 MMHG | RESPIRATION RATE: 16 BRPM | HEART RATE: 99 BPM | WEIGHT: 199 LBS

## 2025-08-09 PROCEDURE — 99212 OFFICE O/P EST SF 10 MIN: CPT

## 2025-08-12 ENCOUNTER — TELEPHONE (OUTPATIENT)
Dept: OBGYN CLINIC | Facility: CLINIC | Age: 35
End: 2025-08-12

## 2025-08-12 ENCOUNTER — ROUTINE PRENATAL (OUTPATIENT)
Dept: OBGYN CLINIC | Facility: CLINIC | Age: 35
End: 2025-08-12

## 2025-08-12 VITALS
SYSTOLIC BLOOD PRESSURE: 102 MMHG | DIASTOLIC BLOOD PRESSURE: 69 MMHG | HEART RATE: 84 BPM | BODY MASS INDEX: 39 KG/M2 | WEIGHT: 228.63 LBS

## 2025-08-12 DIAGNOSIS — O46.90 VAGINAL BLEEDING DURING PREGNANCY (HCC): ICD-10-CM

## 2025-08-12 DIAGNOSIS — O46.92 VAGINAL BLEEDING IN PREGNANCY, SECOND TRIMESTER (HCC): ICD-10-CM

## 2025-08-12 DIAGNOSIS — R10.2 PELVIC PAIN: Primary | ICD-10-CM

## 2025-08-12 DIAGNOSIS — Z34.92 ENCOUNTER FOR SUPERVISION OF NORMAL PREGNANCY IN SECOND TRIMESTER, UNSPECIFIED GRAVIDITY (HCC): Primary | ICD-10-CM

## 2025-08-12 LAB
APPEARANCE: CLEAR
BILIRUBIN: NEGATIVE
GLUCOSE (URINE DIPSTICK): NEGATIVE MG/DL
KETONES (URINE DIPSTICK): NEGATIVE MG/DL
LEUKOCYTES: NEGATIVE
MULTISTIX LOT#: ABNORMAL NUMERIC
NITRITE, URINE: NEGATIVE
OCCULT BLOOD: NEGATIVE
PH, URINE: 6.5 (ref 4.5–8)
PROTEIN (URINE DIPSTICK): NEGATIVE MG/DL
SPECIFIC GRAVITY: 1 (ref 1–1.03)
URINE-COLOR: YELLOW
UROBILINOGEN,SEMI-QN: 0.2 MG/DL (ref 0–1.9)

## 2025-08-12 PROCEDURE — 3078F DIAST BP <80 MM HG: CPT | Performed by: OBSTETRICS & GYNECOLOGY

## 2025-08-12 PROCEDURE — 3074F SYST BP LT 130 MM HG: CPT | Performed by: OBSTETRICS & GYNECOLOGY

## 2025-08-12 PROCEDURE — 81002 URINALYSIS NONAUTO W/O SCOPE: CPT | Performed by: OBSTETRICS & GYNECOLOGY

## 2025-08-13 ENCOUNTER — TELEPHONE (OUTPATIENT)
Dept: OBGYN CLINIC | Facility: CLINIC | Age: 35
End: 2025-08-13

## 2025-08-13 ENCOUNTER — TELEPHONE (OUTPATIENT)
Dept: PERINATAL CARE | Facility: HOSPITAL | Age: 35
End: 2025-08-13

## 2025-08-13 ENCOUNTER — PATIENT MESSAGE (OUTPATIENT)
Dept: OBGYN CLINIC | Facility: CLINIC | Age: 35
End: 2025-08-13

## 2025-08-13 LAB
BV BACTERIA DNA VAG QL NAA+PROBE: NEGATIVE
C GLABRATA DNA VAG QL NAA+PROBE: POSITIVE
C KRUSEI DNA VAG QL NAA+PROBE: NEGATIVE
CANDIDA DNA VAG QL NAA+PROBE: NEGATIVE
T VAGINALIS DNA VAG QL NAA+PROBE: NEGATIVE

## 2025-08-14 ENCOUNTER — TELEPHONE (OUTPATIENT)
Age: 35
End: 2025-08-14

## 2025-08-14 ENCOUNTER — TELEPHONE (OUTPATIENT)
Dept: OBGYN CLINIC | Facility: CLINIC | Age: 35
End: 2025-08-14

## 2025-08-16 ENCOUNTER — LAB ENCOUNTER (OUTPATIENT)
Dept: LAB | Facility: HOSPITAL | Age: 35
End: 2025-08-16
Attending: OBSTETRICS & GYNECOLOGY

## 2025-08-16 DIAGNOSIS — R10.2 PELVIC PAIN: ICD-10-CM

## 2025-08-16 LAB
BILIRUB UR QL: NEGATIVE
CLARITY UR: CLEAR
COLOR UR: COLORLESS
GLUCOSE UR-MCNC: NORMAL MG/DL
HGB UR QL STRIP.AUTO: NEGATIVE
KETONES UR-MCNC: NEGATIVE MG/DL
LEUKOCYTE ESTERASE UR QL STRIP.AUTO: 25
NITRITE UR QL STRIP.AUTO: NEGATIVE
PH UR: 6.5 (ref 5–8)
PROT UR-MCNC: NEGATIVE MG/DL
SP GR UR STRIP: 1 (ref 1–1.03)
UROBILINOGEN UR STRIP-ACNC: NORMAL

## 2025-08-16 PROCEDURE — 81001 URINALYSIS AUTO W/SCOPE: CPT

## 2025-08-16 PROCEDURE — 87086 URINE CULTURE/COLONY COUNT: CPT

## 2025-08-18 ENCOUNTER — TELEPHONE (OUTPATIENT)
Dept: OBGYN CLINIC | Facility: CLINIC | Age: 35
End: 2025-08-18

## 2025-08-18 ENCOUNTER — RESULTS FOLLOW-UP (OUTPATIENT)
Dept: OBGYN CLINIC | Facility: CLINIC | Age: 35
End: 2025-08-18

## 2025-08-18 RX ORDER — TERCONAZOLE 4 MG/G
1 CREAM VAGINAL NIGHTLY
Qty: 45 G | Refills: 0 | Status: SHIPPED | OUTPATIENT
Start: 2025-08-18 | End: 2025-08-25

## 2025-08-20 ENCOUNTER — ROUTINE PRENATAL (OUTPATIENT)
Dept: OBGYN CLINIC | Facility: CLINIC | Age: 35
End: 2025-08-20

## 2025-08-20 VITALS
WEIGHT: 229 LBS | BODY MASS INDEX: 39 KG/M2 | HEART RATE: 97 BPM | DIASTOLIC BLOOD PRESSURE: 64 MMHG | SYSTOLIC BLOOD PRESSURE: 100 MMHG

## 2025-08-20 DIAGNOSIS — Z34.03 ENCOUNTER FOR SUPERVISION OF NORMAL FIRST PREGNANCY IN THIRD TRIMESTER (HCC): Primary | ICD-10-CM

## 2025-08-20 LAB
APPEARANCE: CLEAR
GLUCOSE (URINE DIPSTICK): NEGATIVE MG/DL
MULTISTIX LOT#: NORMAL NUMERIC
NITRITE, URINE: NEGATIVE
URINE-COLOR: YELLOW

## 2025-08-20 PROCEDURE — 3078F DIAST BP <80 MM HG: CPT | Performed by: OBSTETRICS & GYNECOLOGY

## 2025-08-20 PROCEDURE — 81002 URINALYSIS NONAUTO W/O SCOPE: CPT | Performed by: OBSTETRICS & GYNECOLOGY

## 2025-08-20 PROCEDURE — 3074F SYST BP LT 130 MM HG: CPT | Performed by: OBSTETRICS & GYNECOLOGY

## 2025-08-21 ENCOUNTER — HOSPITAL ENCOUNTER (OUTPATIENT)
Dept: PERINATAL CARE | Facility: HOSPITAL | Age: 35
Discharge: HOME | End: 2025-08-21
Attending: OBSTETRICS & GYNECOLOGY

## 2025-08-21 ENCOUNTER — LAB ENCOUNTER (OUTPATIENT)
Dept: LAB | Facility: HOSPITAL | Age: 35
End: 2025-08-21
Attending: OBSTETRICS & GYNECOLOGY

## 2025-08-21 VITALS
WEIGHT: 229 LBS | BODY MASS INDEX: 39 KG/M2 | SYSTOLIC BLOOD PRESSURE: 104 MMHG | DIASTOLIC BLOOD PRESSURE: 70 MMHG | HEART RATE: 106 BPM

## 2025-08-21 DIAGNOSIS — O24.319 MODIFIED WHITE CLASS B PREGESTATIONAL DIABETES MELLITUS (HCC): ICD-10-CM

## 2025-08-21 DIAGNOSIS — O09.523 MULTIGRAVIDA OF ADVANCED MATERNAL AGE IN THIRD TRIMESTER (HCC): ICD-10-CM

## 2025-08-21 DIAGNOSIS — O99.213 OBESITY AFFECTING PREGNANCY IN THIRD TRIMESTER, UNSPECIFIED OBESITY TYPE (HCC): ICD-10-CM

## 2025-08-21 DIAGNOSIS — Z34.03 ENCOUNTER FOR SUPERVISION OF NORMAL FIRST PREGNANCY IN THIRD TRIMESTER (HCC): ICD-10-CM

## 2025-08-21 DIAGNOSIS — O09.513 PRIMIGRAVIDA OF ADVANCED MATERNAL AGE IN THIRD TRIMESTER (HCC): ICD-10-CM

## 2025-08-21 DIAGNOSIS — Z34.92 ENCOUNTER FOR SUPERVISION OF NORMAL PREGNANCY IN SECOND TRIMESTER, UNSPECIFIED GRAVIDITY (HCC): ICD-10-CM

## 2025-08-21 DIAGNOSIS — O24.319 MODIFIED WHITE CLASS B PREGESTATIONAL DIABETES MELLITUS (HCC): Primary | ICD-10-CM

## 2025-08-21 LAB
DEPRECATED RDW RBC AUTO: 46 FL (ref 35.1–46.3)
ERYTHROCYTE [DISTWIDTH] IN BLOOD BY AUTOMATED COUNT: 15.9 % (ref 11–15)
HCT VFR BLD AUTO: 31.4 % (ref 35–48)
HGB BLD-MCNC: 10 G/DL (ref 12–16)
MCH RBC QN AUTO: 25.6 PG (ref 26–34)
MCHC RBC AUTO-ENTMCNC: 31.8 G/DL (ref 31–37)
MCV RBC AUTO: 80.3 FL (ref 80–100)
PLATELET # BLD AUTO: 257 10(3)UL (ref 150–450)
RBC # BLD AUTO: 3.91 X10(6)UL (ref 3.8–5.3)
T PALLIDUM AB SER QL IA: NONREACTIVE
WBC # BLD AUTO: 12 X10(3) UL (ref 4–11)

## 2025-08-21 PROCEDURE — 76816 OB US FOLLOW-UP PER FETUS: CPT | Performed by: OBSTETRICS & GYNECOLOGY

## 2025-08-21 PROCEDURE — 86780 TREPONEMA PALLIDUM: CPT

## 2025-08-21 PROCEDURE — 87389 HIV-1 AG W/HIV-1&-2 AB AG IA: CPT

## 2025-08-21 PROCEDURE — 85027 COMPLETE CBC AUTOMATED: CPT

## 2025-08-21 PROCEDURE — 36415 COLL VENOUS BLD VENIPUNCTURE: CPT

## 2025-08-26 ENCOUNTER — PATIENT MESSAGE (OUTPATIENT)
Dept: OBGYN CLINIC | Facility: CLINIC | Age: 35
End: 2025-08-26

## 2025-09-01 PROBLEM — O46.93 VAGINAL BLEEDING IN PREGNANCY, THIRD TRIMESTER (HCC): Status: ACTIVE | Noted: 2025-09-01

## (undated) DIAGNOSIS — R51.9 CHRONIC NONINTRACTABLE HEADACHE, UNSPECIFIED HEADACHE TYPE: ICD-10-CM

## (undated) DIAGNOSIS — G89.29 CHRONIC NONINTRACTABLE HEADACHE, UNSPECIFIED HEADACHE TYPE: ICD-10-CM

## (undated) NOTE — LETTER
Date: 9/7/2021    Patient Name: Troy Junior          To Whom it may concern:    Ms. Vinicius Wellington was diagnosed with COVID on 8/22/21. She has since recovered from her infection. Please see details below.   COVID19 (Resolved)  Added: 8/12/2021 by Kathia Lieberman

## (undated) NOTE — LETTER
AUTHORIZATION FOR SURGICAL OPERATION OR OTHER PROCEDURE    1. I hereby authorize Dr. Curiel and the [facility] staff assigned to my case to perform the following operation and/or procedure at the [facility]:    _______________________________________________________________________________________________    BOTOX 200 UNITS    _______________________________________________________________________________________________    2.  My physician has explained the nature and purpose of the operation or other procedure, possible alternative methods of treatment, the risks involved, and the possibility of complication to me.  I acknowledge that no guarantee has been made as to the result that may be obtained.  3.  I recognize that, during the course of this operation, or other procedure, unforseen conditions may necessitate additional or different procedure than those listed above.  I, therefore, further authorize and request that the above named physician, his/her physician assistants or designees perform such procedures as are, in his/her professional opinion, necessary and desirable.  4.  Any tissue or organs removed in the operation or other procedure may be disposed of by and at the discretion of the [facility] staff and [facility].  5.  I understand that in the event of a medical emergency, I will be transported by local paramedics to [facility] or other hospital emergency department.  6.  I certify that I have read and fully understand the above consent to operation and/or other procedure.    7.  I acknowledge that my physician has explained sedation/analgesia administration to me including the risks and benefits.  I consent to the administration of sedation/analgesia as may be necessary or desirable in the judgement of my physician.        Witness signature: ___________________________________________________ Date:  ______/______/_____                    Time:  ________ A.M.   P.M.    Patient Name:  _____Leena Norman     [id number]     8/11/1990         Patient signature:  ___________________________________________________        Statement of Physician  My signature below affirms that prior to the time of the procedure, I have explained to the patient and/or his/her guardian, the risks and benefits involved in the proposed treatment and any reasonable alternative to the proposed treatment.  I have also explained the risks and benefits involved in the refusal of the proposed treatment and have answered the patient's questions.                            Date:  ______/______/_______  Provider                      Signature:  __________________________________________________________       Time:  ___________ AGEREMIAS WASSERMAN

## (undated) NOTE — LETTER
Date: 12/12/2019    Patient Name: Welby Gowers          To Whom it may concern: This letter has been written at the patient's request. The above patient was seen at the [facility] for treatment of a medical condition.     This patient

## (undated) NOTE — LETTER
Date: 2024      Patient Name: Leena Norman  : 1990   [id number]          Thank you for choosing [facility] as your health care provider. Your physician has deemed the following medical service(s) necessary. However, your insurance plan may not pay for all of your health care and costs and may deny payment for this service. The fact that your insurance plan does not pay for an item or service does not mean you should not receive it. The purpose of this form is to help you make an informed decision about whether or not you want to receive this service(s) that may not be paid for by your insurance plan.    CPT Code Description     Cost     _________ ___BOTOX 200 UNITS___________  ___$4000____      _________ ______________________________ _____________      _________ ______________________________ _____________      I understand that the above mentioned service(s) or supply may not be covered by my insurance company. I agree to be financially responsible for the cost of this service or supply in the event of my insurance denies payment as a non-covered benefit.        ______________________________________________________________________  Signature of Patient or Patient's Representative  Relationship  Date          ______________________________________________________________________  Signature of Witness to signing of form   Printed Name